# Patient Record
Sex: FEMALE | Race: WHITE | Employment: FULL TIME | URBAN - METROPOLITAN AREA
[De-identification: names, ages, dates, MRNs, and addresses within clinical notes are randomized per-mention and may not be internally consistent; named-entity substitution may affect disease eponyms.]

---

## 2017-01-10 ENCOUNTER — HOSPITAL ENCOUNTER (OUTPATIENT)
Dept: GENERAL RADIOLOGY | Age: 47
Discharge: HOME | End: 2017-01-10
Attending: SURGERY | Admitting: SURGERY

## 2017-01-10 LAB
ANION GAP SERPL CALCULATED.3IONS-SCNC: 12 MMOL/L (ref 4–16)
BUN BLDV-MCNC: 11 MG/DL (ref 6–23)
CALCIUM SERPL-MCNC: 9.4 MG/DL (ref 8.3–10.6)
CHLORIDE BLD-SCNC: 105 MMOL/L (ref 99–110)
CO2: 26 MMOL/L (ref 21–32)
CREAT SERPL-MCNC: 1 MG/DL (ref 0.6–1.1)
GFR AFRICAN AMERICAN: >60 ML/MIN/1.73M2
GFR NON-AFRICAN AMERICAN: 59 ML/MIN/1.73M2
GLUCOSE BLD-MCNC: 93 MG/DL (ref 70–140)
POTASSIUM SERPL-SCNC: 4.4 MMOL/L (ref 3.5–5.1)
SODIUM BLD-SCNC: 143 MMOL/L (ref 135–145)

## 2017-01-11 ENCOUNTER — HOSPITAL ENCOUNTER (OUTPATIENT)
Dept: OTHER | Age: 47
Discharge: HOME | End: 2017-01-11
Attending: SURGERY | Admitting: SURGERY

## 2017-01-11 ENCOUNTER — OFFICE VISIT (OUTPATIENT)
Dept: BARIATRICS/WEIGHT MGMT | Age: 47
End: 2017-01-11

## 2017-01-11 VITALS
HEART RATE: 65 BPM | SYSTOLIC BLOOD PRESSURE: 119 MMHG | WEIGHT: 160.6 LBS | BODY MASS INDEX: 25.21 KG/M2 | HEIGHT: 67 IN | DIASTOLIC BLOOD PRESSURE: 57 MMHG | OXYGEN SATURATION: 97 %

## 2017-01-11 DIAGNOSIS — E89.0 S/P TOTAL THYROIDECTOMY: ICD-10-CM

## 2017-01-11 DIAGNOSIS — R53.83 FATIGUE, UNSPECIFIED TYPE: ICD-10-CM

## 2017-01-11 DIAGNOSIS — E89.0 POSTOPERATIVE HYPOTHYROIDISM: Primary | ICD-10-CM

## 2017-01-11 LAB
CALCIUM IONIZED: 4.44 MG/DL (ref 4.48–5.28)
IONIZED CA: 1.11 MMOL/L (ref 1.12–1.32)

## 2017-01-11 PROCEDURE — 99213 OFFICE O/P EST LOW 20 MIN: CPT | Performed by: SURGERY

## 2017-01-11 ASSESSMENT — ENCOUNTER SYMPTOMS
SHORTNESS OF BREATH: 0
VOMITING: 0
COUGH: 0
NAUSEA: 0
ANAL BLEEDING: 0
CONSTIPATION: 0
DIARRHEA: 0
WHEEZING: 0
COLOR CHANGE: 0
VOICE CHANGE: 0
BLOOD IN STOOL: 0
SORE THROAT: 0
ABDOMINAL PAIN: 0
TROUBLE SWALLOWING: 0
PHOTOPHOBIA: 0

## 2020-09-23 ENCOUNTER — OFFICE VISIT (OUTPATIENT)
Dept: CARDIOLOGY CLINIC | Age: 50
End: 2020-09-23
Payer: COMMERCIAL

## 2020-09-23 VITALS
SYSTOLIC BLOOD PRESSURE: 128 MMHG | WEIGHT: 200 LBS | DIASTOLIC BLOOD PRESSURE: 82 MMHG | HEART RATE: 70 BPM | HEIGHT: 66 IN | BODY MASS INDEX: 32.14 KG/M2

## 2020-09-23 PROBLEM — R06.02 SHORTNESS OF BREATH ON EXERTION: Status: ACTIVE | Noted: 2020-09-23

## 2020-09-23 PROBLEM — R00.2 PALPITATIONS: Status: ACTIVE | Noted: 2020-09-23

## 2020-09-23 PROBLEM — R07.2 PRECORDIAL PAIN: Status: ACTIVE | Noted: 2020-09-23

## 2020-09-23 PROCEDURE — 99204 OFFICE O/P NEW MOD 45 MIN: CPT | Performed by: INTERNAL MEDICINE

## 2020-09-23 PROCEDURE — 93000 ELECTROCARDIOGRAM COMPLETE: CPT | Performed by: INTERNAL MEDICINE

## 2020-09-23 RX ORDER — OMEPRAZOLE 40 MG/1
40 CAPSULE, DELAYED RELEASE ORAL DAILY
COMMUNITY
Start: 2020-07-20

## 2020-10-05 ENCOUNTER — PROCEDURE VISIT (OUTPATIENT)
Dept: CARDIOLOGY CLINIC | Age: 50
End: 2020-10-05
Payer: COMMERCIAL

## 2020-10-05 VITALS
BODY MASS INDEX: 30.01 KG/M2 | HEART RATE: 88 BPM | SYSTOLIC BLOOD PRESSURE: 128 MMHG | DIASTOLIC BLOOD PRESSURE: 72 MMHG | WEIGHT: 198 LBS | HEIGHT: 68 IN

## 2020-10-05 LAB
LV EF: 58 %
LVEF MODALITY: NORMAL

## 2020-10-05 PROCEDURE — 93306 TTE W/DOPPLER COMPLETE: CPT | Performed by: INTERNAL MEDICINE

## 2020-10-05 PROCEDURE — 93015 CV STRESS TEST SUPVJ I&R: CPT | Performed by: INTERNAL MEDICINE

## 2020-10-08 ENCOUNTER — TELEPHONE (OUTPATIENT)
Dept: CARDIOLOGY CLINIC | Age: 50
End: 2020-10-08

## 2020-10-08 NOTE — TELEPHONE ENCOUNTER
Advised patient of results. Patient voiced understanding. Technically difficult examination due to poor acoustic views. .   Left ventricular function is normal, EF is estimated at 55-60%. E/A reversal; indeterminate diastolic function. No regional wall motion abnormalities were detected. No significant valvular abnormalities. No evidence of pericardial effusion. Overall Impression:   Normal exercise performance without angina and ischemic EKG changes. Has baeline EKG changes   Functional Capacity: Fair Exercise Tolerance.

## 2020-10-27 ENCOUNTER — OFFICE VISIT (OUTPATIENT)
Dept: CARDIOLOGY CLINIC | Age: 50
End: 2020-10-27
Payer: COMMERCIAL

## 2020-10-27 VITALS
WEIGHT: 202.6 LBS | DIASTOLIC BLOOD PRESSURE: 82 MMHG | HEIGHT: 67 IN | HEART RATE: 64 BPM | SYSTOLIC BLOOD PRESSURE: 114 MMHG | BODY MASS INDEX: 31.8 KG/M2

## 2020-10-27 PROCEDURE — 99214 OFFICE O/P EST MOD 30 MIN: CPT | Performed by: INTERNAL MEDICINE

## 2020-10-27 NOTE — PROGRESS NOTES
Sudha Carreno MD                                  CARDIOLOGY  NOTE      Chief Complaint:      Follow-up on chest pain and dyspnea    Currently denies any chest pain shortness of breath or palpitations    Underwent exercise stress test which was normal  Underwent echocardiogram which was also normal  Reports as below    Echocardiogram:     Summary   Technically difficult examination due to poor acoustic views. .   Left ventricular function is normal, EF is estimated at 55-60%. E/A reversal; indeterminate diastolic function. No regional wall motion abnormalities were detected. No significant valvular abnormalities. No evidence of pericardial effusion. Stress Test:     Conclusions      Summary   Overall Impression:   Normal exercise performance without angina and ischemic EKG changes. Has baeline EKG changes   Functional Capacity: Fair Exercise Tolerance. Current Outpatient Medications   Medication Sig Dispense Refill    omeprazole (PRILOSEC) 40 MG delayed release capsule Take 40 mg by mouth daily      levothyroxine (LEVOTHROID) 125 MCG tablet Take 1 tablet by mouth Daily 30 tablet 2    Calcium Citrate-Vitamin D (CALCIUM + VIT D, BARIATRIC ADVANTAGE, CHEWABLE TABLET) Take 1 tablet by mouth 2 times daily 120 tablet 5    rizatriptan (MAXALT) 10 MG tablet Take 10 mg by mouth as needed for Migraine       topiramate (TOPAMAX) 25 MG tablet Take 25 mg by mouth nightly        No current facility-administered medications for this visit. Allergies:     Sulfa antibiotics    Patient History:    Past Medical History:   Diagnosis Date    Anesthesia     \"have trouble with my blood pressure going really low after surgery\"    Anxiety     Cough     Occ.  Nonproductive Cough    Difficulty swallowing     \"Due To The Thyroid\"    History of echocardiogram 10/05/2020    EF 55-60%, E/A reversal, indeterminate diastolic function, no significant valvular abnormalities, no pericardial effusion     History heartburn  · Genitourinary: No dysuria, trouble voiding, or hematuria  · Musculoskeletal:  denies any new  joint aches , or pain   · Integumentary: No rash or pruritis  · Neurological: No TIA or stroke symptoms  · Psychiatric: No anxiety or depression  · Endocrine: No malaise, fatigue or temperature intolerance  · Hematologic/Lymphatic: No bleeding problems, blood clots or swollen lymph nodes  · Allergic/Immunologic: No nasal congestion or hives        Objective:      Physical Exam:    /82   Pulse 64   Ht 5' 7\" (1.702 m)   Wt 202 lb 9.6 oz (91.9 kg)   BMI 31.73 kg/m²   Wt Readings from Last 3 Encounters:   10/27/20 202 lb 9.6 oz (91.9 kg)   10/05/20 198 lb (89.8 kg)   09/23/20 200 lb (90.7 kg)     Body mass index is 31.73 kg/m². Vitals:    10/27/20 1424   BP: 114/82   Pulse: 64        General Appearance and Constitutional: Conversant, Well developed, Well nourished, No acute distress, Non-toxic appearance. HEENT:  Normocephalic, Atraumatic, Bilateral external ears normal, Oropharynx moist, No oral exudates,   Nose normal.   Neck- Normal range of motion, No tenderness, Supple  Eyes:  EOMI, Conjunctiva normal, No discharge. Respiratory:  Normal breath sounds, No respiratory distress, No wheezing, No Rales, No Ronchi. No chest tenderness. Cardiovascular: S1-S2, no added heart sounds, No Mumurs appreciated. No gallops, rubs. No Pedal Edema   GI:  Bowel sounds normal, Soft, No tenderness,  :  No costovertebral angle tenderness   Musculoskeletal:  No gross deformities.  Back- No tenderness  Integument:  Well hydrated, no rash   Lymphatic:  No lymphadenopathy noted   Neurologic:  Alert & oriented x 3, Normal motor function, normal sensory function, no focal deficits noted   Psychiatric:  Speech and behavior appropriate       Medical decision making and Data review:    DATA:    No results found for: TROPONINT  BNP:  No results found for: PROBNP  PT/INR:  No results found for: PTINR  No results found for: LABA1C  Lab Results   Component Value Date    CHOL 214 (H) 03/07/2016    TRIG 69 03/07/2016    HDL 68 03/07/2016    LDLCALC 132 (H) 03/07/2016     Lab Results   Component Value Date    WBC 6.0 05/09/2016    HGB 14.0 05/09/2016    HCT 43.1 05/09/2016    MCV 90.5 05/09/2016     05/09/2016     TSH: No results found for: TSH  Lab Results   Component Value Date    AST 11 (L) 03/07/2016    ALT 9 (L) 03/07/2016    BILIDIR 0.2 03/07/2016    BILITOT 0.5 03/07/2016    ALKPHOS 69 03/07/2016         All labs, medications and tests reviewed by myself including data and history from outside source , patient and available family . Impression and Plan:      1. Atypical chest pain. As described above in HPI.   exercise stress test for risk-stratification and to rule out CAD = Negative for ischemia. Symptoms likely GERD related. 2. Shortness of breath with exertion. chocardiogram to evaluate for any structural heart disease negative. Dyspnea improved since last visit. Likely deconditioning   3. Iatrogenic hypothyroidism: Patient is on levothyroxine, follow-up with primary care physician for care  4. History of migraine headaches: Patient is on Maxalt. 5. Diet and Exercise: Patient was encouraged to increase exercise, above symptoms could be related to deconditioning. Return in about 1 year (around 10/27/2021). Counseled extensively and medication compliance urged. We discussed that for the  prevention of ASCVD our  goal is aggressive risk modification. Patient is encouraged to exercise even a brisk walk for 30 minutes  at least 3 to 4 times a week   Various goals were discussed and questions answered. Continue current medications. Appropriate prescriptions are addressed and refills ordered. Questions answered and patient verbalizes understanding. Call for any problems, questions, or concerns.

## 2020-11-12 ENCOUNTER — HOSPITAL ENCOUNTER (OUTPATIENT)
Age: 50
Setting detail: SPECIMEN
Discharge: HOME | End: 2020-11-12
Payer: COMMERCIAL

## 2020-11-12 PROCEDURE — U0002 COVID-19 LAB TEST NON-CDC: HCPCS

## 2020-11-14 LAB
SARS-COV-2: DETECTED
SOURCE: ABNORMAL

## 2021-04-08 ENCOUNTER — OFFICE VISIT (OUTPATIENT)
Dept: ORTHOPEDIC SURGERY | Age: 51
End: 2021-04-08
Payer: COMMERCIAL

## 2021-04-08 VITALS
HEART RATE: 71 BPM | WEIGHT: 202 LBS | HEIGHT: 67 IN | OXYGEN SATURATION: 98 % | BODY MASS INDEX: 31.71 KG/M2 | RESPIRATION RATE: 15 BRPM

## 2021-04-08 DIAGNOSIS — M25.511 ACUTE PAIN OF RIGHT SHOULDER: Primary | ICD-10-CM

## 2021-04-08 DIAGNOSIS — S43.421A SPRAIN OF RIGHT ROTATOR CUFF CAPSULE, INITIAL ENCOUNTER: ICD-10-CM

## 2021-04-08 PROCEDURE — 99203 OFFICE O/P NEW LOW 30 MIN: CPT | Performed by: ORTHOPAEDIC SURGERY

## 2021-04-08 RX ORDER — ATORVASTATIN CALCIUM 10 MG/1
TABLET, FILM COATED ORAL
COMMUNITY
Start: 2021-03-10

## 2021-04-08 RX ORDER — TOPIRAMATE 50 MG/1
TABLET, FILM COATED ORAL
COMMUNITY
Start: 2021-03-05

## 2021-04-08 ASSESSMENT — ENCOUNTER SYMPTOMS
COLOR CHANGE: 0
SHORTNESS OF BREATH: 0
CHEST TIGHTNESS: 0
EYE PAIN: 0
WHEEZING: 0
VOMITING: 0
EYE REDNESS: 0

## 2021-04-08 NOTE — PROGRESS NOTES
4/8/2021   Chief Complaint   Patient presents with    Shoulder Pain     right shoulder pain         History of Present Illness: Eze Villegas is a 45 y.o. female who presents today for evaluation of her right shoulder pain and weakness. She did have an injury 4 months ago while lifting a heavy blanket and felt a sharp burning pain in the right shoulder immediately at the time of the injury. Since that time she had weakness issues and difficulty with elevating and reaching the arm out away from her body or up overhead. She has severe pain deep in the shoulder joint and it does radiate down the lateral aspect of her arm and posteriorly into her shoulder blade. She denies any previous history of injuries or problems with the right shoulder. She has tried treating this conservatively with rest and stretching exercises at home and anti-inflammatory medications but has not had any relief over the last 4 months. Patient presents to the office today for right shoulder pain. Pt states that in December she was lifting a weighted blanket above her head and felt an instant pain in the right shoulder. Pt states she does have a burning pain in the right shoulder. Pt states that pain is globally in the shoulder and will travel into her biceps. pt states that she has loss of ROM of the shoulder she can not lift her arm above her chest or reach out to grab things. Pt states that she is in constant pain in the right shoulder. Pt states that pain today is a 6/10        Medical History  Patient's medications, allergies, past medical, surgical, social and family histories were reviewed and updated as appropriate. Past Medical History:   Diagnosis Date    Anesthesia     \"have trouble with my blood pressure going really low after surgery\"    Anxiety     Cough     Occ.  Nonproductive Cough    Difficulty swallowing     \"Due To The Thyroid\"    History of echocardiogram 10/05/2020    EF 55-60%, E/A reversal, indeterminate diastolic function, no significant valvular abnormalities, no pericardial effusion     History of exercise stress test 10/05/2020    Treadmill, Normal exercise performance without angina and ischemic EKG changes.  Has baseline EKG changes    HX OTHER MEDICAL     \"have to watch, limited ROM of neck since neck surgery\"    Migraine Last Migraine 3-31-16    Shortness of breath     Sinus drainage     Teeth missing     Upper And Lower    Thyroid disease     thyroid nodules\"have had two previous biopsies in the past\"    Wears glasses     [place] teeth extracted     4 [place] Teeth Extracted In Past     Past Surgical History:   Procedure Laterality Date    BACK SURGERY  12-14    ACF \"C6, C7\" With Hardware    BREAST ENHANCEMENT SURGERY Bilateral 2000's    \"Breast Implants\"    DENTAL SURGERY      Teeth Extracted In Past    ENDOSCOPY, COLON, DIAGNOSTIC      egd- \"swallowed a safety pin\"- in 6th grade-was in ICU for 3 days\"    THYROID SURGERY  3-30-16 Fine Needle Aspiration Biopsy    THYROIDECTOMY, COMPLETION N/A 04/12/2016    TUBAL LIGATION  1993    WISDOM TOOTH EXTRACTION      4 [place] Teeth Extracted In Past     Family History   Problem Relation Age of Onset    Heart Disease Father         Open Heart Surgery    High Cholesterol Father     Hearing Loss Sister     Arthritis Mother         Rheumatoid Arthritis    Miscarriages / Stillbirths Mother     Substance Abuse Mother         Alcohol    Cancer Sister         Cancer Unsure What Kind    Substance Abuse Brother         Drugs     Social History     Socioeconomic History    Marital status:      Spouse name: None    Number of children: None    Years of education: None    Highest education level: None   Occupational History    None   Social Needs    Financial resource strain: None    Food insecurity     Worry: None     Inability: None    Transportation needs     Medical: None     Non-medical: None   Tobacco Use    Smoking status: Never Smoker    Smokeless tobacco: Never Used   Substance and Sexual Activity    Alcohol use: No    Drug use: No    Sexual activity: Yes     Partners: Male   Lifestyle    Physical activity     Days per week: None     Minutes per session: None    Stress: None   Relationships    Social connections     Talks on phone: None     Gets together: None     Attends Christianity service: None     Active member of club or organization: None     Attends meetings of clubs or organizations: None     Relationship status: None    Intimate partner violence     Fear of current or ex partner: None     Emotionally abused: None     Physically abused: None     Forced sexual activity: None   Other Topics Concern    None   Social History Narrative    None     Current Outpatient Medications   Medication Sig Dispense Refill    omeprazole (PRILOSEC) 40 MG delayed release capsule Take 40 mg by mouth daily      levothyroxine (LEVOTHROID) 125 MCG tablet Take 1 tablet by mouth Daily 30 tablet 2    Calcium Citrate-Vitamin D (CALCIUM + VIT D, BARIATRIC ADVANTAGE, CHEWABLE TABLET) Take 1 tablet by mouth 2 times daily 120 tablet 5    rizatriptan (MAXALT) 10 MG tablet Take 10 mg by mouth as needed for Migraine       topiramate (TOPAMAX) 25 MG tablet Take 25 mg by mouth nightly       topiramate (TOPAMAX) 50 MG tablet       atorvastatin (LIPITOR) 10 MG tablet        No current facility-administered medications for this visit. Allergies   Allergen Reactions    Sulfa Antibiotics Hives       Review of Systems:   Review of Systems   Constitutional: Negative for chills and fever. HENT: Negative for congestion and sneezing. Eyes: Negative for pain and redness. Respiratory: Negative for chest tightness, shortness of breath and wheezing. Cardiovascular: Negative for chest pain and palpitations. Gastrointestinal: Negative for vomiting. Musculoskeletal: Positive for arthralgias.    Skin: Negative for color empty can test.  Positive drop arm sign. Positive Garcia and Neer signs. Moderate pain at the acromioclavicular joint with crossarm test.    Skin is intact. Sensation is intact in the upper extremity. 2+ radial pulse. No edema. No muscle atrophy. Skin:     General: Skin is warm and dry. Neurological:      Mental Status: She is alert and oriented to person, place, and time. Psychiatric:         Mood and Affect: Mood normal.         Behavior: Behavior normal.            Diagnostic testing:  X-ray images were reviewed by myself and discussed with the patient:  Xr Shoulder Right (min 2 Views)    Result Date: 4/8/2021  X-ray 4 views Right Shoulder: X-rays show no evidence of fracture or degenerative process. Normal bone density. Normal alignment. No abnormal calcifications or soft tissue swelling. Impression: Normal x-ray       Office Procedures:  No orders of the defined types were placed in this encounter. Assessment and Plan  1. Right shoulder sprain, possible rotator cuff tear    The patient is having severe pain and dysfunction in the shoulder following an injury 4 months ago despite appropriate conservative measures. I feel it is medically necessary to obtain an MRI to evaluate for tear of the rotator cuff and other intra-articular pathology such injury to the labrum, biceps tendon, and cartilage surfaces of the shoulder. I have instructed the patient on gentle range of motion exercises for the shoulder and avoidance of lifting pushing and pulling with that arm. We discussed anti-inflammatory medications and she will continue ibuprofen over-the-counter as needed. They will follow-up after the MRI for a review of the results.       Electronically signed by Vianey Rodriguez MD on 4/8/2021 at 9:00 AM

## 2021-04-08 NOTE — PATIENT INSTRUCTIONS
Continue weight-bearing as tolerated. Continue range of motion exercises as instructed. Ice and elevate as needed. Tylenol or Motrin for pain.   MRI of the right shoulder ordered today   Follow up in 2/3 weeks for FU of the MRI   Central Scheduling [phone]

## 2021-04-20 ENCOUNTER — HOSPITAL ENCOUNTER (OUTPATIENT)
Dept: MRI IMAGING | Age: 51
Discharge: HOME | End: 2021-04-20
Payer: COMMERCIAL

## 2021-04-20 DIAGNOSIS — M25.511 ACUTE PAIN OF RIGHT SHOULDER: ICD-10-CM

## 2021-04-20 PROCEDURE — 73221 MRI JOINT UPR EXTREM W/O DYE: CPT

## 2021-04-29 ENCOUNTER — OFFICE VISIT (OUTPATIENT)
Dept: ORTHOPEDIC SURGERY | Age: 51
End: 2021-04-29
Payer: COMMERCIAL

## 2021-04-29 VITALS
HEIGHT: 67 IN | WEIGHT: 202 LBS | HEART RATE: 80 BPM | OXYGEN SATURATION: 99 % | RESPIRATION RATE: 15 BRPM | BODY MASS INDEX: 31.71 KG/M2

## 2021-04-29 DIAGNOSIS — M25.511 ACUTE PAIN OF RIGHT SHOULDER: ICD-10-CM

## 2021-04-29 DIAGNOSIS — S43.421A SPRAIN OF RIGHT ROTATOR CUFF CAPSULE, INITIAL ENCOUNTER: Primary | ICD-10-CM

## 2021-04-29 DIAGNOSIS — M75.01 ADHESIVE CAPSULITIS OF RIGHT SHOULDER: ICD-10-CM

## 2021-04-29 PROCEDURE — 20610 DRAIN/INJ JOINT/BURSA W/O US: CPT | Performed by: ORTHOPAEDIC SURGERY

## 2021-04-29 PROCEDURE — 99214 OFFICE O/P EST MOD 30 MIN: CPT | Performed by: ORTHOPAEDIC SURGERY

## 2021-04-29 ASSESSMENT — ENCOUNTER SYMPTOMS
SHORTNESS OF BREATH: 0
CHEST TIGHTNESS: 0
COLOR CHANGE: 0

## 2021-04-29 NOTE — PATIENT INSTRUCTIONS
Continue weight-bearing as tolerated. Continue range of motion exercises as instructed. Ice and elevate as needed. Tylenol or Motrin for pain. steroid injection given today in the right shoulder today   Follow up in 6 weeks  physical therapy ordered today     Patient Education        Frozen Shoulder: Exercises  Introduction  Here are some examples of exercises for you to try. The exercises may be suggested for a condition or for rehabilitation. Start each exercise slowly. Ease off the exercises if you start to have pain. You will be told when to start these exercises and which ones will work best for you. How to do the exercises  Neck stretches   1. Look straight ahead, and tip your right ear to your right shoulder. Do not let your left shoulder rise up as you tip your head to the right. 2. Hold 15 to 30 seconds. 3. Tilt your head to the left. Do not let your right shoulder rise up as you tip your head to the left. 4. Hold for 15 to 30 seconds. 5. Repeat 2 to 4 times to each side. Shoulder rolls   1. Sit comfortably with your feet shoulder-width apart. You can also do this exercise while standing. 2. Roll your shoulders up, then back, and then down in a smooth, circular motion. 3. Repeat 2 to 4 times. Shoulder flexion (lying down)   For this exercise, you will need a wand. To make a wand, use a piece of PVC pipe or a broom handle with the broom removed. Make the wand about a foot wider than your shoulders. 1. Lie on your back, holding a wand with your hands. Your palms should face down as you hold the wand. Place your hands slightly wider than your shoulders. 2. Keeping your elbows straight, slowly raise your arms over your head until you feel a stretch in your shoulders, upper back, and chest.  3. Hold 15 to 30 seconds. 4. Repeat 2 to 4 times. Shoulder rotation (lying down)   To make a wand, use a piece of PVC pipe or a broom handle with the broom removed.  Make the wand about a foot wider than your shoulders. 1. Lie on your back and hold a wand in both hands with your elbows bent and your palms up. 2. Keeping your elbows close to your body, move the wand across your body toward the arm that has pain. 3. Hold for 15 to 30 seconds. 4. Repeat 2 to 4 times. Shoulder internal rotation with towel   1. Roll up a towel lengthwise. Hold the towel above and behind your head with the arm that is not sore. 2. With your sore arm, reach behind your back and grasp the towel. 3. Using the arm above your head, pull the towel upward until you feel a stretch on the front and outside of your sore shoulder. 4. Hold 15 to 30 seconds. 5. Relax and move the towel back down to the starting position. 6. Repeat 2 to 4 times. Shoulder blade squeeze   1. While standing with your arms at your sides, squeeze your shoulder blades together. Do not raise your shoulders up as you are squeezing. 2. Hold for 6 seconds. 3. Repeat 8 to 12 times. Follow-up care is a key part of your treatment and safety. Be sure to make and go to all appointments, and call your doctor if you are having problems. It's also a good idea to know your test results and keep a list of the medicines you take. Where can you learn more? Go to https://[employer]. org and sign in to your [employer] account. Enter [phone] in the [facility] box to learn more about \"Frozen Shoulder: Exercises. \"     If you do not have an account, please click on the \"Sign Up Now\" link. Current as of: November 16, 2020               Content Version: 12.8  © 4330-1752 [employer]. Care instructions adapted under license by [facility] [place] ([facility]). If you have questions about a medical condition or this instruction, always ask your healthcare professional. [address] any warranty or liability for your use of this information.

## 2021-04-29 NOTE — PROGRESS NOTES
4/29/2021   Chief Complaint   Patient presents with    Shoulder Pain     right shoulder MRI completed on         History of Present Illness: Dilip Rai is a 45 y.o. female who comes today for follow-up of her right shoulder. She recently had her MRI. She continues to have severe pain and stiffness throughout the shoulder with difficulty with any type of movement or use. He has pain while laying on that side. Pain is constant throughout the day and very limiting. She has been trying to work on stretching exercises but feels that she has been very limited with her home program.      Pt returns to the office today for FU of the right shoulder MRI completed on 4/20/21. Pt sates pain today in the right shoulder is an 9/10 pt states since she has been using her shoulder more and since the Mri her pain has increased. Pt states she is having a difficult time sleeping do to the pain. Pt denies and changes in her hx.             Medical History  Patient's medications, allergies, past medical, surgical, social and family histories were reviewed and updated as appropriate. Past Medical History:   Diagnosis Date    Anesthesia     \"have trouble with my blood pressure going really low after surgery\"    Anxiety     Cough     Occ. Nonproductive Cough    Difficulty swallowing     \"Due To The Thyroid\"    History of echocardiogram 10/05/2020    EF 55-60%, E/A reversal, indeterminate diastolic function, no significant valvular abnormalities, no pericardial effusion     History of exercise stress test 10/05/2020    Treadmill, Normal exercise performance without angina and ischemic EKG changes.  Has baseline EKG changes    HX OTHER MEDICAL     \"have to watch, limited ROM of neck since neck surgery\"    Migraine Last Migraine 3-31-16    Shortness of breath     Sinus drainage     Teeth missing     Upper And Lower    Thyroid disease     thyroid nodules\"have had two previous biopsies in the past\"    Wears glasses     Luverne teeth extracted     4 [place] Teeth Extracted In Past     Past Surgical History:   Procedure Laterality Date    BACK SURGERY  12-14    ACF \"C6, C7\" With Hardware    BREAST ENHANCEMENT SURGERY Bilateral 2000's    \"Breast Implants\"    DENTAL SURGERY      Teeth Extracted In Past    ENDOSCOPY, COLON, DIAGNOSTIC      egd- \"swallowed a safety pin\"- in 6th grade-was in ICU for 3 days\"    THYROID SURGERY  3-30-16 Fine Needle Aspiration Biopsy    THYROIDECTOMY, COMPLETION N/A 04/12/2016    TUBAL LIGATION  1993    WISDOM TOOTH EXTRACTION      4 [place] Teeth Extracted In Past     Family History   Problem Relation Age of Onset    Heart Disease Father         Open Heart Surgery    High Cholesterol Father     Hearing Loss Sister     Arthritis Mother         Rheumatoid Arthritis    Miscarriages / Stillbirths Mother     Substance Abuse Mother         Alcohol    Cancer Sister         Cancer Unsure What Kind    Substance Abuse Brother         Drugs     Social History     Socioeconomic History    Marital status:      Spouse name: None    Number of children: None    Years of education: None    Highest education level: None   Occupational History    None   Social Needs    Financial resource strain: None    Food insecurity     Worry: None     Inability: None    Transportation needs     Medical: None     Non-medical: None   Tobacco Use    Smoking status: Never Smoker    Smokeless tobacco: Never Used   Substance and Sexual Activity    Alcohol use: No    Drug use: No    Sexual activity: Yes     Partners: Male   Lifestyle    Physical activity     Days per week: None     Minutes per session: None    Stress: None   Relationships    Social connections     Talks on phone: None     Gets together: None     Attends [employer] service: None     Active member of club or organization: None     Attends meetings of clubs or organizations: None     Relationship status: None  Intimate partner violence     Fear of current or ex partner: None     Emotionally abused: None     Physically abused: None     Forced sexual activity: None   Other Topics Concern    None   Social History Narrative    None     Current Outpatient Medications   Medication Sig Dispense Refill    topiramate (TOPAMAX) 50 MG tablet       atorvastatin (LIPITOR) 10 MG tablet       omeprazole (PRILOSEC) 40 MG delayed release capsule Take 40 mg by mouth daily      levothyroxine (LEVOTHROID) 125 MCG tablet Take 1 tablet by mouth Daily 30 tablet 2    Calcium Citrate-Vitamin D (CALCIUM + VIT D, BARIATRIC ADVANTAGE, CHEWABLE TABLET) Take 1 tablet by mouth 2 times daily 120 tablet 5    rizatriptan (MAXALT) 10 MG tablet Take 10 mg by mouth as needed for Migraine       topiramate (TOPAMAX) 25 MG tablet Take 25 mg by mouth nightly        No current facility-administered medications for this visit. Allergies   Allergen Reactions    Sulfa Antibiotics Hives       Review of Systems:   Review of Systems   Constitutional: Negative for activity change and fever. Respiratory: Negative for chest tightness and shortness of breath. Cardiovascular: Negative for chest pain. Musculoskeletal: Positive for arthralgias. Skin: Negative for color change. Neurological: Negative for dizziness and weakness. Psychiatric/Behavioral: The patient is not nervous/anxious. Examination:  General Exam:  Vitals: Pulse 80   Resp 15   Ht 5' 7\" (1.702 m)   Wt 202 lb (91.6 kg)   SpO2 99%   BMI 31.64 kg/m²    Physical Exam  Vitals signs and nursing note reviewed. Constitutional:       Appearance: Normal appearance. HENT:      Head: Normocephalic and atraumatic. Eyes:      Conjunctiva/sclera: Conjunctivae normal.      Pupils: Pupils are equal, round, and reactive to light. Neck:      Musculoskeletal: Normal range of motion.    Pulmonary:      Effort: Pulmonary effort is normal. Musculoskeletal:      Left shoulder: She exhibits normal range of motion, no tenderness, no bony tenderness, no swelling, no deformity, no laceration, no pain, normal pulse and normal strength. Right elbow: She exhibits normal range of motion, no swelling, no effusion, no deformity and no laceration. No tenderness found. Left elbow: Normal.      Comments: Right Upper Extremity:    There is moderate to severe tenderness diffusely throughout the shoulder. Tenderness to palpation is maximal over the anterior and lateral aspects of the shoulder specifically along the greater tuberosity and proximal biceps tendon. Active range of motion is severely limited due to pain. Forward elevation present to 40 degrees, abduction present to 30 degrees, external rotation 15 degrees, internal rotation to the greater trochanter. Passive range of motion is severely restricted and limited due to pain and apprehension. Forward elevation 60 degrees, abduction 60 degrees. Rotator cuff testing is difficult due to pain. Strength 0/5 forward elevation and abduction of the shoulder severely limited due to pain and apprehension and inability to perform. There is breakaway to strength testing due to pain. Positive empty can test.  Positive drop arm sign. Positive Garcia and Neer signs. Moderate pain at the acromioclavicular joint with crossarm test.    Skin is intact. Sensation is intact in the upper extremity. 2+ radial pulse. No edema. No muscle atrophy. Skin:     General: Skin is warm and dry. Neurological:      Mental Status: She is alert and oriented to person, place, and time.    Psychiatric:         Mood and Affect: Mood normal.         Behavior: Behavior normal.            Diagnostic testing:  X-ray images were reviewed by myself and discussed with the patient:  MRI of the right shoulder completed on 4/20/21      No discrete fluid-filled rotator cuff tear is seen.       Slight attenuation of the intra-articular portion of the long head of biceps   tendon which may reflect partial-thickness attritional-type tearing.       Blunting and increased signal intensity seen within the anterior and   anteroinferior labrum which may reflect a combination of degeneration and   tearing.       Mild osteoarthritis affecting the acromioclavicular joint.       No evidence of acute fracture, dislocation or avascular necrosis seen.           Office Procedures:  Orders Placed This Encounter   Procedures   [facility]     Referral Priority:   Routine     Referral Type:   Eval and Treat     Referral Reason:   Specialty Services Required     Requested Specialty:   Physical Therapy     Number of Visits Requested:   1    [place] ARTHROCENTESIS ASPIR&/INJ MAJOR JT/BURSA W/O US       Assessment and Plan  1. Right shoulder adhesive capsulitis    The patient's exam and MRI are consistent with adhesive capsulitis. We discussed the diagnosis of frozen shoulder and the expected recovery process. I have explained to her the importance of regular stretching exercises. I believe that she would benefit from formal physical therapy to help assist with regaining her mobility at the shoulder. I have sent a referral for physical therapy to address rehabilitation and strengthening of the rotator cuff. Due to the patient's current pain level I feel that they would benefit from a steroid injection to the right shoulder prior to participation in therapy. Procedure Note, Right Shoulder glenohumeral joint injection:  The right shoulder was prepped with alcohol and then the glenohumeral joint space was injected with 80 mg of Kenalog and 4 mL of 1% plain lidocaine using a 22-gauge needle. Sterile Band-Aid was applied. The patient tolerated it well with no complications.     I have instructed the patient on basic stretching exercises and we discussed activity modification and avoidance of strenuous activities at the shoulder. We discussed the use of over-the-counter anti-inflammatory medications as needed. I would like the patient to follow-up here in 6 weeks to check the results of the therapy and injection.         Electronically signed by Rancho Estrella MD on 4/29/2021 at 11:44 AM

## 2021-05-19 ENCOUNTER — HOSPITAL ENCOUNTER (OUTPATIENT)
Dept: PHYSICAL THERAPY | Age: 51
Setting detail: THERAPIES SERIES
Discharge: HOME | End: 2021-05-19
Payer: COMMERCIAL

## 2021-05-19 PROCEDURE — 97161 PT EVAL LOW COMPLEX 20 MIN: CPT

## 2021-05-19 PROCEDURE — 97110 THERAPEUTIC EXERCISES: CPT

## 2021-05-19 PROCEDURE — 97535 SELF CARE MNGMENT TRAINING: CPT

## 2021-05-19 NOTE — PROGRESS NOTES
compliance with HEP. Short term goal 2: Patient will present with improved R shoulder PROM by 10 degrees in all ranges. Short term goal 3: Patient will present with improved R shoulder flexion from 80 degrees AROM to 110 degrees AROM so that she can reach for items in her cabinet. Short term goal 4: Patient will report a decrease in R shoulder pain from 8/10 to 4/10. Short term goal 5: Patient will present with an improved score on the QuickDASH from 43.2% impaired to 33% impaired.   Patient Goals   Patient goals : Decrease R shoulder pain, improve ability to reach       Therapy Time   Individual Concurrent Group Co-treatment   Time In 1610         Time Out 1652         Minutes 42         Timed Code Treatment Minutes: [address], PT

## 2021-05-19 NOTE — FLOWSHEET NOTE
Outpatient Physical Therapy  [facility]           [x] Phone: [phone]   Fax: [phone]  [place]           [] Phone: [phone]   Fax: [phone]        Physical Therapy Daily Treatment Note  Date:  2021    Patient Name:  Bridget Terry    :  1970  MRN: [id number]  Restrictions/Precautions:    Diagnosis:   Diagnosis: Adhesive Capsulitis of right shoulder  Date of Injury/Surgery:   Treatment Diagnosis: Treatment Diagnosis: Adhesive Capsulitis of the R shoulder, R shoulder pain    Insurance/Certification information: PT Insurance Information: [employer]- Precert required after 12 visits. 20 visits PCY total   Referring Physician:  Referring Practitioner: Dr. Keivn Wong  Next Doctor Visit:    Plan of care signed (Y/N):  pending  Outcome Measure:   Visit# / total visits:     Pain level: 8/10 R shoulder  Goals:     Patient goals : Decrease R shoulder pain, improve ability to reach  Short term goals  Time Frame for Short term goals: 5 weeks  Short term goal 1: Patient will report compliance with HEP. Short term goal 2: Patient will present with improved R shoulder PROM by 10 degrees in all ranges. Short term goal 3: Patient will present with improved R shoulder flexion from 80 degrees AROM to 110 degrees AROM so that she can reach for items in her cabinet. Short term goal 4: Patient will report a decrease in R shoulder pain from 8/10 to 4/10. Short term goal 5: Patient will present with an improved score on the QuickDASH from 43.2% impaired to 33% impaired. Summary of Evaluation: Assessment: Nam Villalpando is a 45year old female who presents to physical therapy with adhesive capsulitis of the right shoulder. Objective findings include decreased R shoulder AROM and PROM, decreased R shoulder strength, and postural impairments.  Radha Ward will benefit from skilled therapeutic intervention in this setting to decrease pain and improve overall function of the right shoulder. Subjective:  See eval         Any changes in Ambulatory Summary Sheet? None        Objective:  See eval   COVID screening questions were asked and patient attested that there had been no contact or symptoms        Exercises: (No more than 4 columns)   Exercise/Equipment 5/19/21 #1 Date Date           WARM UP                     TABLE      Supine shoulder flexion AAROM w/cane X10, 10\" hold     Seated shoulder abduction AAROM w/cane X10, 10\" hold     Shoulder internal rotation behind back AAROM w/cane X5, 10\" hold     Shoulder flexion table walk back X5, 10\" hold     Pulleys next                    STANDING                                                     PROPRIOCEPTION                                    MODALITIES                      Other Therapeutic Activities/Education:  Education on anatomy of the shoulder joint and etiology of adhesive capsulitis. Discussed findings from initial evaluation. Discussed pain management techniques using heat to loosen the shoulder prior to stretches. Discussed buying pulley's for home use. Home Exercise Program:    Created and reviewed, exercises listed above    Manual Treatments:        Modalities:        Communication with other providers:  POC sent for cosign on 5/19/21      Assessment:  (Response towards treatment session) (Pain Rating)    Assessment: Hermelinda Gutierrez is a 45year old female who presents to physical therapy with adhesive capsulitis of the right shoulder. Objective findings include decreased R shoulder AROM and PROM, decreased R shoulder strength, and postural impairments. Wilder Bone will benefit from skilled therapeutic intervention in this setting to decrease pain and improve overall function of the right shoulder.       Plan for Next Session:        Time In / Time Out:    0129-1371       If [facility] Please Indicate Time In/Out/Total Time  CPT Code Time in Time out Total Time                                                            Total for session             Timed Code/Total Treatment Minutes:  42'/42'  Eval 24', There ex 10', ADL 8'      Next Progress Note due:  10th visit      Plan of Care Interventions:  [x] Therapeutic Exercise  [x] Modalities:  [x] Therapeutic Activity     [] Ultrasound  [] Estim  [] Gait Training      [] Cervical Traction [] Lumbar Traction  [x] Neuromuscular Re-education    [x] Cold/hotpack [] Iontophoresis   [x] Instruction in HEP      [x] Vasopneumatic   [] Dry Needling    [x] Manual Therapy               [] Aquatic Therapy              Electronically signed by:  Shaji Dawson, PT, 5/19/2021, 5:15 PM

## 2021-05-19 NOTE — PLAN OF CARE
Outpatient Physical Therapy           [facility]           [x] Phone: [phone]   Fax: [phone]  [place]           [] Phone: [phone]   Fax: [phone]     To: Referring Practitioner: Dr. Aleta Dominguez    From: Neri Culp PT, PT     Patient: Tati Mcgee       : 1970  Diagnosis: Diagnosis: Adhesive Capsulitis of right shoulder   Treatment Diagnosis: Treatment Diagnosis: Adhesive Capsulitis of the R shoulder, R shoulder pain   Date: 2021    Physical Therapy Certification/Re-Certification Form  Dear Dr. Aleta Dominguez  The following patient has been evaluated for physical therapy services and for therapy to continue, insurance requires physician review of the treatment plan initially and every 90 days. Please review the attached evaluation and/or summary of the patient's plan of care, and verify that you agree therapy should continue by signing the attached document and sending it back to our office. Assessment:    Assessment: Jayla Craven is a 45year old female who presents to physical therapy with adhesive capsulitis of the right shoulder. Objective findings include decreased R shoulder AROM and PROM, decreased R shoulder strength, and postural impairments. Coco Donis will benefit from skilled therapeutic intervention in this setting to decrease pain and improve overall function of the right shoulder.     Plan of Care/Treatment to date:  [x] Therapeutic Exercise  [x] Modalities:  [x] Therapeutic Activity     [] Ultrasound  [] Electrical Stimulation  [] Gait Training      [] Cervical Traction [] Lumbar Traction  [x] Neuromuscular Re-education    [x] Cold/hotpack [] Iontophoresis   [x] Instruction in HEP      [x] Vasopneumatic    [] Dry Needling  [x] Manual Therapy               [] Aquatic Therapy       Other:          Frequency/Duration:  # Days per week: [] 1 day # Weeks: [] 1 week [] 5 weeks     [x] 2 days   [] 2 weeks [x] 6 weeks     [] 3 days   [] 3 weeks [] 7 weeks     [] 4 days   [] 4

## 2021-05-28 ENCOUNTER — HOSPITAL ENCOUNTER (OUTPATIENT)
Dept: PHYSICAL THERAPY | Age: 51
Setting detail: THERAPIES SERIES
Discharge: HOME | End: 2021-05-28
Payer: COMMERCIAL

## 2021-05-28 PROCEDURE — 97110 THERAPEUTIC EXERCISES: CPT

## 2021-05-28 PROCEDURE — 97140 MANUAL THERAPY 1/> REGIONS: CPT

## 2021-05-28 PROCEDURE — 97016 VASOPNEUMATIC DEVICE THERAPY: CPT

## 2021-05-28 NOTE — FLOWSHEET NOTE
shoulder. Subjective:  Pt reports she now understands what the cortizone shot is all about - she can now sleep with almost no pain complaints!!       Any changes in Ambulatory Summary Sheet? None      Objective:  See below    COVID screening questions were asked and patient attested that there had been no contact or symptoms    Exercises: (No more than 4 columns)   Exercise/Equipment 5/19/21 #1 5/28/21  #2 Date           WARM UP                     TABLE      Supine shoulder flexion AAROM w/cane X10, 10\" hold     Seated shoulder abduction AAROM w/cane X10, 10\" hold     Shoulder internal rotation behind back AAROM w/cane X5, 10\" hold     Shoulder flexion table walk back X5, 10\" hold     Pulleys next 1x10 flexion          Manual therapy  See below    STANDING                                                     PROPRIOCEPTION                                    MODALITIES      Vaso  15'              Other Therapeutic Activities/Education:  Education on anatomy of the shoulder joint and etiology of adhesive capsulitis. Discussed findings from initial evaluation. Discussed pain management techniques using heat to loosen the shoulder prior to stretches. Discussed buying pulley's for home use. Home Exercise Program:  Created and reviewed, exercises listed above      Manual Treatments:  PROM/MOBS to the right shoulder joint complex with TPR/MFR to related hypertonicity      Modalities:  Game Ready to the right shoulder joint at low pressure for 15' in the seated position      Communication with other providers:  POC sent for cosign on 5/19/21      Assessment:  (Response towards treatment session) (Pain Rating)  Pt very tight all vectors and has difficulty relaxing - cues to relax help, but needed consistently throughout session today. Pt with firm end feels all vectors and with pain all vectors as well.   Pt may have been pushing herself harder than ideal on pulleys, as she indicates increased complaints of pain after pulleys. Pt will continue to benefit from skilled PT services to restore ROM to more functional ranges and improve scapular stability to return to prior activity levels safely. Pt rates pain at 6/10 before modalities. Assessment: Connie Yip is a 45year old female who presents to physical therapy with adhesive capsulitis of the right shoulder. Objective findings include decreased R shoulder AROM and PROM, decreased R shoulder strength, and postural impairments. Jason Orr will benefit from skilled therapeutic intervention in this setting to decrease pain and improve overall function of the right shoulder. Plan for Next Session:   Continue to progress towards goals set at IE.       Time In / Time Out:    1428/1524       If [facility] Please Indicate Time In/Out/Total Time  CPT Code Time in Time out Total Time                                                            Total for session             Timed Code/Total Treatment Minutes:  MAN X 31' X 2;   TE x 10' X 1;   GR x 15' X 1      Next Progress Note due:  10th visit      Plan of Care Interventions:  [x] Therapeutic Exercise  [x] Modalities:  [x] Therapeutic Activity     [] Ultrasound  [] Estim  [] Gait Training      [] Cervical Traction [] Lumbar Traction  [x] Neuromuscular Re-education    [x] Cold/hotpack [] Iontophoresis   [x] Instruction in HEP      [x] Vasopneumatic   [] Dry Needling    [x] Manual Therapy               [] Aquatic Therapy              Electronically signed by:  Garrett William II, PTA [id number]    5/28/2021, 7:29 AM

## 2021-06-02 ENCOUNTER — HOSPITAL ENCOUNTER (OUTPATIENT)
Dept: PHYSICAL THERAPY | Age: 51
Discharge: HOME | End: 2021-06-02

## 2021-06-02 NOTE — FLOWSHEET NOTE
Physical Therapy  Cancellation/No-show Note  Patient Name:  Srini Valencia  :  1970   Date:  2021  Cancelled visits to date:1  No-shows to date: 0    For today's appointment patient:  [x]  Cancelled  []  Rescheduled appointment  []  No-show     Reason given by patient:  []  Patient ill  []  Conflicting appointment  []  No transportation    []  Conflict with work  [x]  No reason given  []  Other:     Comments:      Electronically signed by:  Kilo Montemayor PTA     2021,12:50 PM

## 2021-06-04 ENCOUNTER — HOSPITAL ENCOUNTER (OUTPATIENT)
Dept: PHYSICAL THERAPY | Age: 51
Setting detail: THERAPIES SERIES
Discharge: HOME | End: 2021-06-04
Payer: COMMERCIAL

## 2021-06-04 PROCEDURE — 97016 VASOPNEUMATIC DEVICE THERAPY: CPT

## 2021-06-04 PROCEDURE — 97140 MANUAL THERAPY 1/> REGIONS: CPT

## 2021-06-04 PROCEDURE — 97110 THERAPEUTIC EXERCISES: CPT

## 2021-06-04 NOTE — FLOWSHEET NOTE
Outpatient Physical Therapy  [place]           [x] Phone: [phone]   Fax: [phone]  [place]           [] Phone: [phone]   Fax: [phone]        Physical Therapy Daily Treatment Note  Date:  2021    Patient Name:  Barry Ingram    :  1970  MRN: [id number]  Restrictions/Precautions:    Diagnosis:   Diagnosis: Adhesive Capsulitis of right shoulder  Date of Injury/Surgery:   Treatment Diagnosis: Treatment Diagnosis: Adhesive Capsulitis of the R shoulder, R shoulder pain    Insurance/Certification information: PT Insurance Information: [employer]- Precert required after 12 visits. 20 visits PCY total   Referring Physician:  Referring Practitioner: Dr. Christina Franklin  Next Doctor Visit:    Plan of care signed (Y/N):  pending  Outcome Measure:   Visit# / total visits:   3/12  Pain level: 0/10 R shoulder  Goals:     Patient goals : Decrease R shoulder pain, improve ability to reach  Short term goals  Time Frame for Short term goals: 5 weeks  Short term goal 1: Patient will report compliance with HEP. Short term goal 2: Patient will present with improved R shoulder PROM by 10 degrees in all ranges. Short term goal 3: Patient will present with improved R shoulder flexion from 80 degrees AROM to 110 degrees AROM so that she can reach for items in her cabinet. Short term goal 4: Patient will report a decrease in R shoulder pain from 8/10 to 4/10. Short term goal 5: Patient will present with an improved score on the QuickDASH from 43.2% impaired to 33% impaired. Summary of Evaluation: Assessment: Derek Mireles is a 45year old female who presents to physical therapy with adhesive capsulitis of the right shoulder. Objective findings include decreased R shoulder AROM and PROM, decreased R shoulder strength, and postural impairments.  Estefany Conn will benefit from skilled therapeutic intervention in this setting to decrease pain and improve overall function of the right shoulder. Subjective:  Pt reports she really only has complaints of ache - no real pain unless she really pushes it hard. Pt noticed her hand was swollen after her last session. Any changes in Ambulatory Summary Sheet? None      Objective:  See below    COVID screening questions were asked and patient attested that there had been no contact or symptoms    Exercises: (No more than 4 columns)   Exercise/Equipment 5/19/21 #1 5/28/21  #2 6/4/21  #3           WARM UP                     TABLE      Supine shoulder flexion AAROM w/cane X10, 10\" hold     Seated shoulder abduction AAROM w/cane X10, 10\" hold     Shoulder internal rotation behind back AAROM w/cane X5, 10\" hold     Shoulder flexion table walk back X5, 10\" hold     Pulleys next 1x10 flexion 1x15         Manual therapy  See below See below   STANDING                                                     PROPRIOCEPTION                                    MODALITIES      Vaso  15' 15'             Other Therapeutic Activities/Education:  Education on anatomy of the shoulder joint and etiology of adhesive capsulitis. Discussed findings from initial evaluation. Discussed pain management techniques using heat to loosen the shoulder prior to stretches. Discussed buying pulley's for home use. Home Exercise Program:  Created and reviewed, exercises listed above      Manual Treatments:  PROM/MOBS to the right shoulder joint complex with TPR/MFR to related hypertonicity      Modalities:  Game Ready to the right shoulder joint at low pressure for 15' in the seated position      Communication with other providers:  POC sent for cosign on 5/19/21      Assessment:  (Response towards treatment session) (Pain Rating)  Pt remains very tight all vectors. Pt with firm end feels all vectors and with pain all vectors as well.   Pt does demo some increases in AROM after manual interventions - but still very tight in general.  Pt will continue to benefit from skilled PT services to restore ROM to more functional ranges and improve scapular stability to return to prior activity levels safely. Pt rates pain at 0/10 after session today. Assessment: Magui Li is a 45year old female who presents to physical therapy with adhesive capsulitis of the right shoulder. Objective findings include decreased R shoulder AROM and PROM, decreased R shoulder strength, and postural impairments. Jessika Carmichael will benefit from skilled therapeutic intervention in this setting to decrease pain and improve overall function of the right shoulder. Plan for Next Session:   Continue to progress towards goals set at IE.       Time In / Time Out:    1445/1545       If [facility] Please Indicate Time In/Out/Total Time  CPT Code Time in Time out Total Time                                                            Total for session             Timed Code/Total Treatment Minutes:  MAN X 30' X 2;   TE x 15' X 1;   GR x 15' X 1      Next Progress Note due:  10th visit      Plan of Care Interventions:  [x] Therapeutic Exercise  [x] Modalities:  [x] Therapeutic Activity     [] Ultrasound  [] Estim  [] Gait Training      [] Cervical Traction [] Lumbar Traction  [x] Neuromuscular Re-education    [x] Cold/hotpack [] Iontophoresis   [x] Instruction in HEP      [x] Vasopneumatic   [] Dry Needling    [x] Manual Therapy               [] Aquatic Therapy              Electronically signed by:  Kristy Diaz II, PTA [id number]    6/4/2021, 7:28 AM

## 2021-06-07 ENCOUNTER — HOSPITAL ENCOUNTER (OUTPATIENT)
Dept: PHYSICAL THERAPY | Age: 51
Setting detail: THERAPIES SERIES
Discharge: HOME | End: 2021-06-07
Payer: COMMERCIAL

## 2021-06-07 PROCEDURE — 97110 THERAPEUTIC EXERCISES: CPT

## 2021-06-07 PROCEDURE — 97140 MANUAL THERAPY 1/> REGIONS: CPT

## 2021-06-07 NOTE — FLOWSHEET NOTE
Outpatient Physical Therapy  [place]           [x] Phone: [phone]   Fax: [phone]  [place]           [] Phone: [phone]   Fax: [phone]        Physical Therapy Daily Treatment Note  Date:  2021    Patient Name:  Tamika Aldrich    :  1970  MRN: [id number]  Restrictions/Precautions:    Diagnosis:   Diagnosis: Adhesive Capsulitis of right shoulder  Date of Injury/Surgery:   Treatment Diagnosis: Treatment Diagnosis: Adhesive Capsulitis of the R shoulder, R shoulder pain    Insurance/Certification information: PT Insurance Information: [employer]- Precert required after 12 visits. 20 visits PCY total   Referring Physician:  Referring Practitioner: Dr. Fidelina Pinon  Next Doctor Visit:    Plan of care signed (Y/N):  pending  Outcome Measure:   Visit# / total visits:     Pain level: 0/10 R shoulder  Goals:     Patient goals : Decrease R shoulder pain, improve ability to reach  Short term goals  Time Frame for Short term goals: 5 weeks  Short term goal 1: Patient will report compliance with HEP. Short term goal 2: Patient will present with improved R shoulder PROM by 10 degrees in all ranges. Short term goal 3: Patient will present with improved R shoulder flexion from 80 degrees AROM to 110 degrees AROM so that she can reach for items in her cabinet. Short term goal 4: Patient will report a decrease in R shoulder pain from 8/10 to 4/10. Short term goal 5: Patient will present with an improved score on the QuickDASH from 43.2% impaired to 33% impaired. Summary of Evaluation: Assessment: Chari Lopez is a 45year old female who presents to physical therapy with adhesive capsulitis of the right shoulder. Objective findings include decreased R shoulder AROM and PROM, decreased R shoulder strength, and postural impairments.  Ron Gunderson will benefit from skilled therapeutic intervention in this setting to decrease pain and improve overall function of the right shoulder. Subjective: Any changes in Ambulatory Summary Sheet? None      Objective:  See below    COVID screening questions were asked and patient attested that there had been no contact or symptoms     AROM flexion 95°    AROM abduction 100°    PROM  flex   110°  PROM abduction 108°   PROM   ER  55    Exercises: (No more than 4 columns)   Exercise/Equipment 5/19/21 #1 5/28/21  #2 6/4/21  #3 6/7/2021 #4            WARM UP                        TABLE                     Supine shoulder flexion AAROM w/cane X10, 10\" hold   10x 10\"    Seated shoulder abduction AAROM w/cane X10, 10\" hold      Shoulder internal rotation behind back AAROM w/cane X5, 10\" hold      Shoulder flexion table walk back X5, 10\" hold   Flexion stretch on table 10x 10\" with STM   Pulleys next 1x10 flexion 1x15 15x 5\" hold          Manual therapy  See below See below See below   STANDING                                                             PROPRIOCEPTION                                          MODALITIES       Vaso  15' 15' --              Other Therapeutic Activities/Education:  Education on anatomy of the shoulder joint and etiology of adhesive capsulitis. Discussed findings from initial evaluation. Discussed pain management techniques using heat to loosen the shoulder prior to stretches. Discussed buying pulley's for home use. Home Exercise Program:  Created and reviewed, exercises listed above      Manual Treatments:  PROM/MOBS to the right shoulder joint complex with TPR/MFR to related hypertonicity x 20'        GT was applied to the  R UT, deltoid, pec, biceps, and triceps  because the assessment demonstrated decreased ROM and tissue mobility . G4 and G5 and sweep stroke  based on the assessment and treatment goals. The patients skin at the end of the treatment showed improved ROM and tissue mobilty.   Overall GT treatment time 13'      30' total  time    Modalities: none      Communication with other providers:  POC sent for cosign on 5/19/21      Assessment:  (Response towards treatment session) (Pain Rating)  Pt tolerated treatment fair today. Pt was able to get more ROM after treatment. Pt will continue to beneifit from more ROM and strengthening as able. Pt rated pain at 2/10 after treatment. Assessment: Priscila Vang is a 45year old female who presents to physical therapy with adhesive capsulitis of the right shoulder. Objective findings include decreased R shoulder AROM and PROM, decreased R shoulder strength, and postural impairments. Maine Vasquez will benefit from skilled therapeutic intervention in this setting to decrease pain and improve overall function of the right shoulder. Plan for Next Session:   Continue to progress towards goals set at IE.       Time In / Time Out:    1445/1545       If [facility] Please Indicate Time In/Out/Total Time  CPT Code Time in Time out Total Time                                                            Total for session             Timed Code/Total Treatment Minutes: 60'/ 61'  2 man ( 30') 2 TE ( 30')     Next Progress Note due:  10th visit      Plan of Care Interventions:  [x] Therapeutic Exercise  [x] Modalities:  [x] Therapeutic Activity     [] Ultrasound  [] Estim  [] Gait Training      [] Cervical Traction [] Lumbar Traction  [x] Neuromuscular Re-education    [x] Cold/hotpack [] Iontophoresis   [x] Instruction in HEP      [x] Vasopneumatic   [] Dry Needling    [x] Manual Therapy               [] Aquatic Therapy              Electronically signed by:  Will Gonzalez PTA    6/7/2021, 2:52 PM     6/7/2021,5:54 PM

## 2021-06-10 ENCOUNTER — OFFICE VISIT (OUTPATIENT)
Dept: ORTHOPEDIC SURGERY | Age: 51
End: 2021-06-10
Payer: COMMERCIAL

## 2021-06-10 ENCOUNTER — HOSPITAL ENCOUNTER (OUTPATIENT)
Dept: PHYSICAL THERAPY | Age: 51
Setting detail: THERAPIES SERIES
Discharge: HOME | End: 2021-06-10
Payer: COMMERCIAL

## 2021-06-10 VITALS
WEIGHT: 202 LBS | HEIGHT: 67 IN | OXYGEN SATURATION: 98 % | RESPIRATION RATE: 16 BRPM | BODY MASS INDEX: 31.71 KG/M2 | HEART RATE: 78 BPM

## 2021-06-10 DIAGNOSIS — M75.01 ADHESIVE CAPSULITIS OF RIGHT SHOULDER: Primary | ICD-10-CM

## 2021-06-10 DIAGNOSIS — S43.421A SPRAIN OF RIGHT ROTATOR CUFF CAPSULE, INITIAL ENCOUNTER: ICD-10-CM

## 2021-06-10 PROCEDURE — 97016 VASOPNEUMATIC DEVICE THERAPY: CPT

## 2021-06-10 PROCEDURE — 99213 OFFICE O/P EST LOW 20 MIN: CPT | Performed by: ORTHOPAEDIC SURGERY

## 2021-06-10 PROCEDURE — 97140 MANUAL THERAPY 1/> REGIONS: CPT

## 2021-06-10 PROCEDURE — 97530 THERAPEUTIC ACTIVITIES: CPT

## 2021-06-10 NOTE — PATIENT INSTRUCTIONS
Continue to work on ROM and strengthening exercises per PT protocol  Continuance of therapy ordered which would be beneficial to patient as discussed with provider  May take OTC pain relievers as needed  Rest,  Ice, and elevate as needed  Weightbearing and activities as tolerated  Follow up in 2 months

## 2021-06-10 NOTE — FLOWSHEET NOTE
Outpatient Physical Therapy  [place]           [x] Phone: [phone]   Fax: [phone]  [place]           [] Phone: [phone]   Fax: [phone]        Physical Therapy Daily Treatment Note  Date:  6/10/2021    Patient Name:  Kenan Nguyễn    :  1970  MRN: [id number]  Restrictions/Precautions:    Diagnosis:   Diagnosis: Adhesive Capsulitis of right shoulder  Date of Injury/Surgery:   Treatment Diagnosis: Treatment Diagnosis: Adhesive Capsulitis of the R shoulder, R shoulder pain    Insurance/Certification information: PT Insurance Information: [employer]- Precert required after 12 visits. 20 visits PCY total   Referring Physician:  Referring Practitioner: Dr. Lauren Truong  Next Doctor Visit:    Plan of care signed (Y/N):   Outcome Measure:   Visit# / total visits:     Pain level: 2-3/10 R shoulder  Goals:     Patient goals : Decrease R shoulder pain, improve ability to reach  Short term goals  Time Frame for Short term goals: 5 weeks  Short term goal 1: Patient will report compliance with HEP. Short term goal 2: Patient will present with improved R shoulder PROM by 10 degrees in all ranges. Short term goal 3: Patient will present with improved R shoulder flexion from 80 degrees AROM to 110 degrees AROM so that she can reach for items in her cabinet. Short term goal 4: Patient will report a decrease in R shoulder pain from 8/10 to 4/10. Short term goal 5: Patient will present with an improved score on the QuickDASH from 43.2% impaired to 33% impaired. Summary of Evaluation: Assessment: Mathew Carlton is a 45year old female who presents to physical therapy with adhesive capsulitis of the right shoulder. Objective findings include decreased R shoulder AROM and PROM, decreased R shoulder strength, and postural impairments.  Sriram Cornelius will benefit from skilled therapeutic intervention in this setting to decrease pain and improve overall function of the right shoulder. Subjective:  Pt stated that her pain was about 2-3/10. Pt stated that she saw Dr. Bhupendra Young prior to therapy today and that he decided against another cortisone shot due to patient having    Any changes in Ambulatory Summary Sheet? None      Objective:      COVID screening questions were asked and patient attested that there had been no contact or symptoms     AROM flexion 95°    AROM abduction 100°    PROM  flex   110°  PROM abduction 110°   PROM   ER  55°    Exercises: (No more than 4 columns)   Exercise/Equipment 5/28/21  #2 6/4/21  #3 6/7/2021 #4 6/10/ 2021 #5            WARM UP                        TABLE       Cane press ups    10x2           Supine shoulder flexion AAROM w/cane   10x 10\"  10x2 5\"    Seated shoulder abduction AAROM w/cane       Shoulder internal rotation behind back AAROM w/cane       Shoulder flexion table walk back   Flexion stretch on table 10x 10\" with STM    Pulleys 1x10 flexion 1x15 15x 5\" hold Flexion and scap   15x 5\" ea           Manual therapy See below See below See below See below   STANDING       SB roll on table     flexion ( PTA scap assist) x 10 x 5\"                                                   PROPRIOCEPTION                                          MODALITIES       Vaso 15' 15' -- 10'               Other Therapeutic Activities/Education:  Education on anatomy of the shoulder joint and etiology of adhesive capsulitis. Discussed findings from initial evaluation. Discussed pain management techniques using heat to loosen the shoulder prior to stretches. Discussed buying pulley's for home use.      Home Exercise Program:  Created and reviewed, exercises listed above      Manual Treatments:  PROM/MOBS to the right shoulder joint complex with TPR/MFR to related hypertonicity x 30'                 Modalities: vaso x 10' to R shoulder with pillow       Communication with other providers:  POC sent for cosign on 5/19/21      Assessment:  (Response towards treatment session) (Pain Rating)  Pt tolerated treatment fair today. Easier to get ROM today, but not real change in measurements today. Pt will continue to beneifit from more ROM and strengthening as able. Pt rated pain at 2/10 after treatment. Assessment: Margo Platt is a 45year old female who presents to physical therapy with adhesive capsulitis of the right shoulder. Objective findings include decreased R shoulder AROM and PROM, decreased R shoulder strength, and postural impairments. Corrinne Bibles will benefit from skilled therapeutic intervention in this setting to decrease pain and improve overall function of the right shoulder. Plan for Next Session:   Continue to progress towards goals set at IE.       Time In / Time Out:    2918/1541       If [facility] Please Indicate Time In/Out/Total Time  CPT Code Time in Time out Total Time                                                            Total for session             Timed Code/Total Treatment Minutes: 53'/63' 2 man (30') 1 TE (23') 1 vaso (10')     Next Progress Note due:  10th visit      Plan of Care Interventions:  [x] Therapeutic Exercise  [x] Modalities:  [x] Therapeutic Activity     [] Ultrasound  [] Estim  [] Gait Training      [] Cervical Traction [] Lumbar Traction  [x] Neuromuscular Re-education    [x] Cold/hotpack [] Iontophoresis   [x] Instruction in HEP      [x] Vasopneumatic   [] Dry Needling    [x] Manual Therapy               [] Aquatic Therapy              Electronically signed by:  Graeme Guerra PTA    6/10/2021, 12:56 PM       6/10/2021,6:08 PM

## 2021-06-10 NOTE — PROGRESS NOTES
Patient returns to the office for a follow up on a steroid injection that was administered on 4/29/21 with pain reported in office at a 4/10. She states that it took her a while to start noticing relief from the injection, but now her symptoms have resolved enough to allow her to comfortably sleep at night and has had improvement in her ROM. She continues to work with therapy on stretching and ROM exercises with some continued discomfort and pain upon raising the arm above the head, but overall progressing well. No new injury or worsening of symptoms reported.

## 2021-06-14 ENCOUNTER — HOSPITAL ENCOUNTER (OUTPATIENT)
Dept: PHYSICAL THERAPY | Age: 51
Setting detail: THERAPIES SERIES
Discharge: HOME | End: 2021-06-14
Payer: COMMERCIAL

## 2021-06-14 PROCEDURE — 97110 THERAPEUTIC EXERCISES: CPT

## 2021-06-14 PROCEDURE — 97140 MANUAL THERAPY 1/> REGIONS: CPT

## 2021-06-14 ASSESSMENT — ENCOUNTER SYMPTOMS
CHEST TIGHTNESS: 0
COLOR CHANGE: 0
SHORTNESS OF BREATH: 0

## 2021-06-14 NOTE — PROGRESS NOTES
6/10/2021   Chief Complaint   Patient presents with    Shoulder Pain     right        History of Present Illness: Zac Reed is a 45 y.o. female who returns today for follow-up of her right shoulder pain, stiffness, and weakness. She has noticed some improvement with the injection and physical therapy. She feels that her shoulder has noticed increased function and range of motion but she is still very limited because of ongoing stiffness and difficulty with reaching out over her body or away from her body. She does feel improvements with therapy and would like to continue additional therapy visits    Patient returns to the office for a follow up on a steroid injection that was administered on 4/29/21 with pain reported in office at a 4/10. She states that it took her a while to start noticing relief from the injection, but now her symptoms have resolved enough to allow her to comfortably sleep at night and has had improvement in her ROM. She continues to work with therapy on stretching and ROM exercises with some continued discomfort and pain upon raising the arm above the head, but overall progressing well. No new injury or worsening of symptoms reported. Medical History  Patient's medications, allergies, past medical, surgical, social and family histories were reviewed and updated as appropriate. Review of Systems   Constitutional: Negative for activity change and fever. Respiratory: Negative for chest tightness and shortness of breath. Cardiovascular: Negative for chest pain. Skin: Negative for color change. Neurological: Negative for dizziness. Psychiatric/Behavioral: The patient is not nervous/anxious.                                                 Examination:  General Exam:  Vitals: Pulse 78   Resp 16   Ht 5' 7\" (1.702 m)   Wt 202 lb (91.6 kg)   SpO2 98%   BMI 31.64 kg/m²    Physical Exam     Right Upper Extremity:    There is improved but persistent moderate tenderness diffusely throughout the shoulder. Tenderness to palpation is maximal over the anterior and lateral aspects of the shoulder specifically along the greater tuberosity and proximal biceps tendon. Active range of motion is very limited due to pain. Forward elevation present to 90 degrees, abduction present to 70 degrees, external rotation 15 degrees, internal rotation to the greater trochanter. Passive range of motion is nearly equal    Rotator cuff testing is difficult due to pain. Strength 4/5 forward elevation and abduction of the shoulder. There is breakaway to strength testing due to pain. Positive empty can test.    Negative drop arm sign. Positive Garcia and Neer signs. Mild pain at the acromioclavicular joint with crossarm test.    Skin is intact. Sensation is intact in the upper extremity. 2+ radial pulse. No edema. No muscle atrophy. Office Procedures:  Orders Placed This Encounter   Procedures   [facility]     Referral Priority:   Routine     Referral Type:   Eval and Treat     Referral Reason:   Specialty Services Required     Requested Specialty:   Occupational Therapy     Number of Visits Requested:   1       Assessment and Plan  1. Right shoulder adhesive capsulitis    She has made good progress with the physical therapy and injection however she still has significant stiffness and weakness in the right shoulder. I have explained to her that this is a prolonged process and will likely benefit from continued physical therapy and home exercise program.  She may have met a lot of her allotted physical therapy visits through her insurance. I recommended that we request additional visits so that she may continue to make improvements with her function and regain range of motion at the shoulder. I have ordered another session of physical therapy and we discussed other conservative treatments.   Continue with over-the-counter anti-inflammatories, massage, icing, and home stretching.     Follow-up in 2 months for check of her progress            Electronically signed by Carin Garcia MD on 6/14/2021 at 2:12 PM

## 2021-06-14 NOTE — FLOWSHEET NOTE
Outpatient Physical Therapy  [place]           [x] Phone: [phone]   Fax: [phone]  [facility]           [] Phone: [phone]   Fax: [phone]        Physical Therapy Daily Treatment Note  Date:  2021    Patient Name:  Nena Carlton    :  1970  MRN: [id number]  Restrictions/Precautions:    Diagnosis:   Diagnosis: Adhesive Capsulitis of right shoulder  Date of Injury/Surgery:   Treatment Diagnosis: Treatment Diagnosis: Adhesive Capsulitis of the R shoulder, R shoulder pain    Insurance/Certification information: PT Insurance Information: [employer]- Precert required after 12 visits. 20 visits PCY total   Referring Physician:  Referring Practitioner: Dr. Miles Laughlin  Next Doctor Visit:    Plan of care signed (Y/N):   Outcome Measure:   Visit# / total visits:     Pain level: 2/10 R shoulder  Goals:     Patient goals : Decrease R shoulder pain, improve ability to reach  Short term goals  Time Frame for Short term goals: 5 weeks  Short term goal 1: Patient will report compliance with HEP. Short term goal 2: Patient will present with improved R shoulder PROM by 10 degrees in all ranges. Short term goal 3: Patient will present with improved R shoulder flexion from 80 degrees AROM to 110 degrees AROM so that she can reach for items in her cabinet. Short term goal 4: Patient will report a decrease in R shoulder pain from 8/10 to 4/10. Short term goal 5: Patient will present with an improved score on the QuickDASH from 43.2% impaired to 33% impaired. Summary of Evaluation: Assessment: Angela Collins is a 45year old female who presents to physical therapy with adhesive capsulitis of the right shoulder. Objective findings include decreased R shoulder AROM and PROM, decreased R shoulder strength, and postural impairments.  Noberto Primrose will benefit from skilled therapeutic intervention in this setting to decrease pain and improve overall function of the right shoulder. Subjective:  Dr. Lyubov Shah decided against an additional cortisone shot. Tanisha Irizarry reports that she has been trying to swim in her pool with improved shoulder motion. Any changes in Ambulatory Summary Sheet? None      Objective:      COVID screening questions were asked and patient attested that there had been no contact or symptoms      PROM  flex   115°  PROM abduction 95°           Exercises: (No more than 4 columns)   Exercise/Equipment 6/7/2021 #4 6/10/ 2021 #5 6/14/21 #6           WARM UP                     TABLE      Cane press ups  10x2  10x2          Supine shoulder flexion AAROM w/cane 10x 10\"  10x2 5\"  10x2 5\"    Seated shoulder abduction AAROM w/cane      Shoulder internal rotation behind back AAROM w/cane      Shoulder flexion table walk back Flexion stretch on table 10x 10\" with STM     Pulleys 15x 5\" hold Flexion and scap   15x 5\" ea  Flexion and scap   2x10 5\" ea          Manual therapy See below See below See below   STANDING      SB roll on table   flexion ( PTA scap assist) x 10 x 5\"  flexion ( PTA scap assist) x 10 x 5\"                                             PROPRIOCEPTION                                    MODALITIES      Vaso -- 10'               Other Therapeutic Activities/Education:  Education on anatomy of the shoulder joint and etiology of adhesive capsulitis. Discussed findings from initial evaluation. Discussed pain management techniques using heat to loosen the shoulder prior to stretches. Discussed buying pulley's for home use. Home Exercise Program:  Created and reviewed, exercises listed above      Manual Treatments:  PROM/MOBS to the right shoulder joint complex with TPR/MFR to related hypertonicity.  Scapular mobes with patient in sidelying x 30'        Modalities:       Communication with other providers:  POC sent for cosign on 5/19/21      Assessment:  (Response towards treatment session) (Pain Rating)  Pt with some improvements in regards to PROM

## 2021-06-17 ENCOUNTER — HOSPITAL ENCOUNTER (OUTPATIENT)
Dept: PHYSICAL THERAPY | Age: 51
Setting detail: THERAPIES SERIES
Discharge: HOME | End: 2021-06-17
Payer: COMMERCIAL

## 2021-06-17 PROCEDURE — 97140 MANUAL THERAPY 1/> REGIONS: CPT

## 2021-06-17 PROCEDURE — 97016 VASOPNEUMATIC DEVICE THERAPY: CPT

## 2021-06-17 PROCEDURE — 97110 THERAPEUTIC EXERCISES: CPT

## 2021-06-17 NOTE — FLOWSHEET NOTE
Outpatient Physical Therapy  [place]           [x] Phone: [phone]   Fax: [phone]  [place]           [] Phone: [phone]   Fax: [phone]        Physical Therapy Daily Treatment Note  Date:  2021    Patient Name:  Rishi Saldana    :  1970  MRN: [id number]  Restrictions/Precautions:    Diagnosis:   Diagnosis: Adhesive Capsulitis of right shoulder  Date of Injury/Surgery:   Treatment Diagnosis: Treatment Diagnosis: Adhesive Capsulitis of the R shoulder, R shoulder pain    Insurance/Certification information: PT Insurance Information: [employer]- Precert required after 12 visits. 20 visits PCY total   Referring Physician:  Referring Practitioner: Dr. Sarmad Goodman  Next Doctor Visit:    Plan of care signed (Y/N):   Outcome Measure:   Visit# / total visits:     Pain level: 0/10 R shoulder  Goals:     Patient goals : Decrease R shoulder pain, improve ability to reach  Short term goals  Time Frame for Short term goals: 5 weeks  Short term goal 1: Patient will report compliance with HEP. Short term goal 2: Patient will present with improved R shoulder PROM by 10 degrees in all ranges. Short term goal 3: Patient will present with improved R shoulder flexion from 80 degrees AROM to 110 degrees AROM so that she can reach for items in her cabinet. Short term goal 4: Patient will report a decrease in R shoulder pain from 8/10 to 4/10. Short term goal 5: Patient will present with an improved score on the QuickDASH from 43.2% impaired to 33% impaired. Summary of Evaluation: Assessment: Tracey Ferrera is a 45year old female who presents to physical therapy with adhesive capsulitis of the right shoulder. Objective findings include decreased R shoulder AROM and PROM, decreased R shoulder strength, and postural impairments.  Ruthanna Ormond will benefit from skilled therapeutic intervention in this setting to decrease pain and improve overall function of the right shoulder. Subjective:  Pt states no pain but discomfort in R shoulder since last session that increases with movement. Pt stated that she has been trying to do her HEP, but not as often as she  should. Any changes in Ambulatory Summary Sheet? None      Objective:      COVID screening questions were asked and patient attested that there had been no contact or symptoms    Pt required min VC to correct form during cane press ups and seated cane exercises. Exercises: (No more than 4 columns)   Exercise/Equipment 6/7/2021 #4 6/10/ 2021 #5 6/14/21 #6 6/17/2021 #7            WARM UP                        TABLE       Cane press ups  10x2  10x2  10 x 2    Supine shoulder ER AAROM w/cane    10 x 2 5\" hold   Supine shoulder flexion AAROM w/cane 10x 10\"  10x2 5\"  10x2 5\"  10 x 2 5\" hold   Seated shoulder abduction AAROM w/cane    10 x 2 5\" hold  scaption   Shoulder internal rotation behind back AAROM w/cane    --   Shoulder flexion table walk back Flexion stretch on table 10x 10\" with STM   --   Pulleys 15x 5\" hold Flexion and scap   15x 5\" ea  Flexion and scap   2x10 5\" ea  Flexion and scap   2x10 5\" ea    Seated shoulder ER AAROM w/cane    10 x 2 5\" hold   Manual therapy See below See below See below See below    STANDING       SB roll on table   flexion ( PTA scap assist) x 10 x 5\"  flexion ( PTA scap assist) x 10 x 5\"  --                                                  PROPRIOCEPTION                                          MODALITIES       Vaso -- 10'   10'              Other Therapeutic Activities/Education:  Education on anatomy of the shoulder joint and etiology of adhesive capsulitis. Discussed findings from initial evaluation. Discussed pain management techniques using heat to loosen the shoulder prior to stretches. Discussed buying pulley's for home use.      Home Exercise Program:  Created and reviewed, exercises listed above      Manual Treatments:  PROM/MOBS to the right shoulder joint complex all directions with TPR/MFR to related hypertonicity. Scapular mobs with patient in sidelying x 30'       Modalities: vaso x 10' to R shoulder with pillow       Communication with other providers:  POC sent for cosign on 5/19/21      Assessment:   Pt tolerated treatment fair today. Pt continues to have poor mechanics with cane exercises and needs manual assist to maintain good alignment. Pt showed slight increased mobility post mobs. Pt stated that ER AROM was especially painful. Pt rated pain at 0/10 after vaso. Pt will continue to benefit from more ROM/ strengthening. Assessment: Kevin Collins is a 45year old female who presents to physical therapy with adhesive capsulitis of the right shoulder. Objective findings include decreased R shoulder AROM and PROM, decreased R shoulder strength, and postural impairments. Ana Laura Lance will benefit from skilled therapeutic intervention in this setting to decrease pain and improve overall function of the right shoulder. Plan for Next Session:   Continue to progress towards goals set at IE. Time In / Time Out:  7760-6027     If [facility] Please Indicate Time In/Out/Total Time  CPT Code Time in Time out Total Time                                                            Total for session             Timed Code/Total Treatment Minutes:    2 man (30') 1 vaso (10') 1 TE (20')    Next Progress Note due:  10th visit      Plan of Care Interventions:  [x] Therapeutic Exercise  [x] Modalities:  [x] Therapeutic Activity     [] Ultrasound  [] Estim  [] Gait Training      [] Cervical Traction [] Lumbar Traction  [x] Neuromuscular Re-education    [x] Cold/hotpack [] Iontophoresis   [x] Instruction in HEP      [x] Vasopneumatic   [] Dry Needling    [x] Manual Therapy               [] Aquatic Therapy              Electronically signed by:  Kaiden Gonzalez PTA     6/17/2021, 11:13 AM     Eren Fitzgerald      6/17/2021,5:30 PM

## 2021-06-22 ENCOUNTER — HOSPITAL ENCOUNTER (OUTPATIENT)
Dept: PHYSICAL THERAPY | Age: 51
Discharge: HOME | End: 2021-06-22

## 2021-06-22 NOTE — FLOWSHEET NOTE
Physical Therapy  Cancellation/No-show Note  Patient Name:  Calixto Rodríguez  :  1970   Date:  2021  Cancelled visits to date: 1  No-shows to date: 0    For today's appointment patient:  [x]  Cancelled  []  Rescheduled appointment  []  No-show     Reason given by patient:  [x]  Patient ill  []  Conflicting appointment  []  No transportation    []  Conflict with work  []  No reason given  []  Other:     Comments:      Electronically signed by:  Vanessa Martinez PTA,

## 2021-06-24 ENCOUNTER — HOSPITAL ENCOUNTER (OUTPATIENT)
Dept: PHYSICAL THERAPY | Age: 51
Setting detail: THERAPIES SERIES
Discharge: HOME | End: 2021-06-24
Payer: COMMERCIAL

## 2021-06-24 PROCEDURE — 97140 MANUAL THERAPY 1/> REGIONS: CPT

## 2021-06-24 PROCEDURE — 97016 VASOPNEUMATIC DEVICE THERAPY: CPT

## 2021-06-24 PROCEDURE — 97110 THERAPEUTIC EXERCISES: CPT

## 2021-06-24 NOTE — FLOWSHEET NOTE
Outpatient Physical Therapy  [place]           [x] Phone: [phone]   Fax: [phone]  [place]           [] Phone: [phone]   Fax: [phone]        Physical Therapy Daily Treatment Note  Date:  2021    Patient Name:  Yenny Fields    :  1970  MRN: [id number]  Restrictions/Precautions:    Diagnosis:   Diagnosis: Adhesive Capsulitis of right shoulder  Date of Injury/Surgery:   Treatment Diagnosis: Treatment Diagnosis: Adhesive Capsulitis of the R shoulder, R shoulder pain    Insurance/Certification information: PT Insurance Information: [employer]- Precert required after 12 visits. 20 visits PCY total   Referring Physician:  Referring Practitioner: Dr. Obi Santos  Next Doctor Visit:    Plan of care signed (Y/N):   Outcome Measure:   Visit# / total visits:   Pain level: 0/10 R shoulder  Goals:     Patient goals : Decrease R shoulder pain, improve ability to reach  Short term goals  Time Frame for Short term goals: 5 weeks  Short term goal 1: Patient will report compliance with HEP. Short term goal 2: Patient will present with improved R shoulder PROM by 10 degrees in all ranges. Short term goal 3: Patient will present with improved R shoulder flexion from 80 degrees AROM to 110 degrees AROM so that she can reach for items in her cabinet. Short term goal 4: Patient will report a decrease in R shoulder pain from 8/10 to 4/10. Short term goal 5: Patient will present with an improved score on the QuickDASH from 43.2% impaired to 33% impaired. Summary of Evaluation: Assessment: Karin Tristan is a 45year old female who presents to physical therapy with adhesive capsulitis of the right shoulder. Objective findings include decreased R shoulder AROM and PROM, decreased R shoulder strength, and postural impairments. Gretel Lance will benefit from skilled therapeutic intervention in this setting to decrease pain and improve overall function of the right shoulder.     Any changes in Ambulatory Summary Sheet? None    Subjective: Pt stated that she really isn't having any pain today. Pt stated that she just doesn't have much ROM. Objective:      COVID screening questions were asked and patient attested that there had been no contact or symptoms    Pt required min VC to correct form during cane press ups and seated cane exercises. PROM  flex   126°  PROM abduction 106°  PROM ER 53°    Exercises: (No more than 4 columns)   Exercise/Equipment 6/7/2021 #4 6/10/ 2021 #5 6/14/21 #6 6/17/2021 #7 6/24/2021 #8             WARM UP                           TABLE        Cane press ups  10x2  10x2  10 x 2  10x2   Supine shoulder ER AAROM w/cane    10 x 2 5\" hold    Supine shoulder flexion AAROM w/cane 10x 10\"  10x2 5\"  10x2 5\"  10 x 2 5\" hold 10 x 2 5\" hold   Seated shoulder abduction AAROM w/cane    10 x 2 5\" hold  scaption standing   Shoulder internal rotation behind back AAROM w/cane    --    Shoulder flexion table walk back Flexion stretch on table 10x 10\" with STM   --    Pulleys 15x 5\" hold Flexion and scap   15x 5\" ea  Flexion and scap   2x10 5\" ea  Flexion and scap   2x10 5\" ea  Flexion and scap   2x10 5\" ea    Seated shoulder ER AAROM w/cane    10 x 2 5\" hold    Manual therapy See below See below See below See below  See below   STANDING        SB roll on table   flexion ( PTA scap assist) x 10 x 5\"  flexion ( PTA scap assist) x 10 x 5\"  --    Shoulder abduction w/ cane     10x2                                                PROPRIOCEPTION                                                MODALITIES        Vaso -- 10'   10' 10'                Other Therapeutic Activities/Education:  Education on anatomy of the shoulder joint and etiology of adhesive capsulitis. Discussed findings from initial evaluation. Discussed pain management techniques using heat to loosen the shoulder prior to stretches. Discussed buying pulley's for home use.      Home Exercise Program:  Created and reviewed, exercises listed above      Manual Treatments:  PROM/MOBS to the right shoulder joint complex all directions with TPR/MFR to related hypertonicity. Scapular mobs with patient in sidelying x 30'       Modalities: vaso x 10' to R shoulder with pillow       Communication with other providers:  POC sent for cosign on 5/19/21      Assessment:   Pt tolerated treatment fair today. Pt was able to make some gains in flexion and abduction. Pt rated pain at 0-1/10 ,but stated that it was more irritated than pain. Pt will continue to benefit from more ROM and strengthening as able. Assessment: Johanen Rojas is a 45year old female who presents to physical therapy with adhesive capsulitis of the right shoulder. Objective findings include decreased R shoulder AROM and PROM, decreased R shoulder strength, and postural impairments. Miguel Angel will benefit from skilled therapeutic intervention in this setting to decrease pain and improve overall function of the right shoulder. Plan for Next Session:   Continue to progress towards goals set at IE.       Time In / Time Out:  1135/1235     If [facility] Please Indicate Time In/Out/Total Time  CPT Code Time in Time out Total Time                                                            Total for session             Timed Code/Total Treatment Minutes: 50'/ 61'   2 man (30') 1 vaso (10') 1 TE (20')    Next Progress Note due:  10th visit      Plan of Care Interventions:  [x] Therapeutic Exercise  [x] Modalities:  [x] Therapeutic Activity     [] Ultrasound  [] Estim  [] Gait Training      [] Cervical Traction [] Lumbar Traction  [x] Neuromuscular Re-education    [x] Cold/hotpack [] Iontophoresis   [x] Instruction in HEP      [x] Vasopneumatic   [] Dry Needling    [x] Manual Therapy               [] Aquatic Therapy              Electronically signed by:  Sg Munson PTA     6/24/2021, 10:40 AM           6/24/2021,6:58 PM

## 2021-06-29 ENCOUNTER — HOSPITAL ENCOUNTER (OUTPATIENT)
Dept: PHYSICAL THERAPY | Age: 51
Setting detail: THERAPIES SERIES
Discharge: HOME | End: 2021-06-29
Payer: COMMERCIAL

## 2021-06-29 PROCEDURE — 97016 VASOPNEUMATIC DEVICE THERAPY: CPT

## 2021-06-29 PROCEDURE — 97110 THERAPEUTIC EXERCISES: CPT

## 2021-06-29 PROCEDURE — 97140 MANUAL THERAPY 1/> REGIONS: CPT

## 2021-06-29 PROCEDURE — 97530 THERAPEUTIC ACTIVITIES: CPT

## 2021-06-29 NOTE — FLOWSHEET NOTE
Outpatient Physical Therapy  [place]           [x] Phone: [phone]   Fax: [phone]  [place]           [] Phone: [phone]   Fax: [phone]        Physical Therapy Daily Treatment Note  Date:  2021    Patient Name:  Werner Card    :  1970  MRN: [id number]  Restrictions/Precautions:    Diagnosis:   Diagnosis: Adhesive Capsulitis of right shoulder  Date of Injury/Surgery:   Treatment Diagnosis: Treatment Diagnosis: Adhesive Capsulitis of the R shoulder, R shoulder pain    Insurance/Certification information: PT Insurance Information: [employer]- Precert required after 12 visits. 20 visits PCY total   Referring Physician:  Referring Practitioner: Dr. Bhupendra Young  Next Doctor Visit:    Plan of care signed (Y/N):   Outcome Measure:   Visit# / total visits:   Pain level: 4/10 neck and  R shoulder  Goals:     Patient goals : Decrease R shoulder pain, improve ability to reach  Short term goals  Time Frame for Short term goals: 5 weeks  Short term goal 1: Patient will report compliance with HEP. Short term goal 2: Patient will present with improved R shoulder PROM by 10 degrees in all ranges. Short term goal 3: Patient will present with improved R shoulder flexion from 80 degrees AROM to 110 degrees AROM so that she can reach for items in her cabinet. Short term goal 4: Patient will report a decrease in R shoulder pain from 8/10 to 4/10. Short term goal 5: Patient will present with an improved score on the QuickDASH from 43.2% impaired to 33% impaired. Summary of Evaluation: Assessment: Rianna Florence is a 45year old female who presents to physical therapy with adhesive capsulitis of the right shoulder. Objective findings include decreased R shoulder AROM and PROM, decreased R shoulder strength, and postural impairments. Sabiha Gupta will benefit from skilled therapeutic intervention in this setting to decrease pain and improve overall function of the right shoulder.     Any changes in Ambulatory Summary Sheet? None    Subjective: Pt stated that she had increased neck pain and a headache after her last visit. Pt stated that the headache developed into a migraine. Pt rated pain at 4/10 today in neck and R shoulder. Pt stated that Dr. Jacqui Rubi was trying to request for additional visits from her insurance. Objective:      COVID screening questions were asked and patient attested that there had been no contact or symptoms  Noted increased edema and tightness in biceps/ deltoid today    PROM  flex   130°   PROM abduction 121°  PROM ER 50°    Exercises: (No more than 4 columns)   Exercise/Equipment 6/14/21 #6 6/17/2021 #7 6/24/2021 #8 6/29/2021 #9            WARM UP                        TABLE       Cane press ups 10x2  10 x 2  10x2 10x AROM   Supine shoulder ER AAROM w/cane  10 x 2 5\" hold  man   Supine shoulder flexion AAROM w/cane 10x2 5\"  10 x 2 5\" hold 10 x 2 5\" hold 10x2 5\"   10x AROM   Seated shoulder abduction AAROM w/cane  10 x 2 5\" hold  scaption standing    Shoulder internal rotation behind back AAROM w/cane  --     Shoulder flexion table walk back  --     Pulleys Flexion and scap   2x10 5\" ea  Flexion and scap   2x10 5\" ea  Flexion and scap   2x10 5\" ea  --   Seated chin tucks    10x2 5\"    Seated shoulder ER AAROM w/cane  10 x 2 5\" hold     Manual therapy See below See below  See below See below   STANDING       SB roll on table  flexion ( PTA scap assist) x 10 x 5\"  --     Shoulder abduction w/ cane   10x2                                            PROPRIOCEPTION                                          MODALITIES       Vaso  10' 10'  10'    HP to neck     10'        Other Therapeutic Activities/Education:  Education on anatomy of the shoulder joint and etiology of adhesive capsulitis. Discussed findings from initial evaluation. Discussed pain management techniques using heat to loosen the shoulder prior to stretches. Discussed buying pulley's for home use.      Home Exercise Program:  Created and reviewed, exercises listed above      Manual Treatments:  PROM/MOBS to the right shoulder, scapular mobs with patient in sidelying, and \"milking\" edema massage to R arm at 90° and gentle manual distaction, and STM to neck x 35'       Modalities: vaso x 10' to R shoulder with pillow       Communication with other providers:  POC sent for cosign on 5/19/21      Assessment:   Pt tolerated treatment fair today. Pt was able to get more pain-free motion after manual treatment. Pt rated pain at 2/10 in neck and shoulder after HP/ vaso. Pt will benefit from more ROM and strengthening as able. Assessment: Rachelle Rodriguez is a 45year old female who presents to physical therapy with adhesive capsulitis of the right shoulder. Objective findings include decreased R shoulder AROM and PROM, decreased R shoulder strength, and postural impairments. Wilver Spicer will benefit from skilled therapeutic intervention in this setting to decrease pain and improve overall function of the right shoulder.       Plan for Next Session:   Continue to progress towards goals set at IE.  DNT????      Time In / Time Out: 1530/1640     If [facility] Please Indicate Time In/Out/Total Time  CPT Code Time in Time out Total Time                                                            Total for session             Timed Code/Total Treatment Minutes: 60'/70' 2 man (35') 1 vaso (10') 1 TA (10') 1 TE (15')     Next Progress Note due:  10th visit      Plan of Care Interventions:  [x] Therapeutic Exercise  [x] Modalities:  [x] Therapeutic Activity     [] Ultrasound  [] Estim  [] Gait Training      [] Cervical Traction [] Lumbar Traction  [x] Neuromuscular Re-education    [x] Cold/hotpack [] Iontophoresis   [x] Instruction in HEP      [x] Vasopneumatic   [] Dry Needling    [x] Manual Therapy               [] Aquatic Therapy              Electronically signed by:  Chidi Franklin PTA     6/29/2021, 10:58 AM     6/29/2021,7:20 PM

## 2021-07-02 ENCOUNTER — HOSPITAL ENCOUNTER (OUTPATIENT)
Dept: PHYSICAL THERAPY | Age: 51
Setting detail: THERAPIES SERIES
Discharge: HOME | End: 2021-07-02
Payer: COMMERCIAL

## 2021-07-02 PROCEDURE — 97140 MANUAL THERAPY 1/> REGIONS: CPT

## 2021-07-02 PROCEDURE — 97016 VASOPNEUMATIC DEVICE THERAPY: CPT

## 2021-07-02 PROCEDURE — 97110 THERAPEUTIC EXERCISES: CPT

## 2021-07-02 NOTE — FLOWSHEET NOTE
Outpatient Physical Therapy  [place]           [x] Phone: [phone]   Fax: [phone]  [place]           [] Phone: [phone]   Fax: [phone]        Physical Therapy Daily Treatment Note  Date:  2021    Patient Name:  Eduin Akbar    :  1970  MRN: [id number]  Restrictions/Precautions:    Diagnosis:   Diagnosis: Adhesive Capsulitis of right shoulder  Date of Injury/Surgery:   Treatment Diagnosis: Treatment Diagnosis: Adhesive Capsulitis of the R shoulder, R shoulder pain    Insurance/Certification information: PT Insurance Information: [employer]- Precert required after 12 visits. 20 visits PCY total   Referring Physician:  Referring Practitioner: Dr. Sierra Richardson  Next Doctor Visit:    Plan of care signed (Y/N):   Outcome Measure:   Visit# / total visits: 10/12  Pain level: 0-1/10 neck and  0-1/10 R shoulder  Goals:     Patient goals : Decrease R shoulder pain, improve ability to reach  Short term goals  Time Frame for Short term goals: 5 weeks  Short term goal 1: Patient will report compliance with HEP. Short term goal 2: Patient will present with improved R shoulder PROM by 10 degrees in all ranges. Short term goal 3: Patient will present with improved R shoulder flexion from 80 degrees AROM to 110 degrees AROM so that she can reach for items in her cabinet. Short term goal 4: Patient will report a decrease in R shoulder pain from 8/10 to 4/10. Short term goal 5: Patient will present with an improved score on the QuickDASH from 43.2% impaired to 33% impaired. Summary of Evaluation: Assessment: Elly Conrad is a 45year old female who presents to physical therapy with adhesive capsulitis of the right shoulder. Objective findings include decreased R shoulder AROM and PROM, decreased R shoulder strength, and postural impairments.  Kassy Alexis will benefit from skilled therapeutic intervention in this setting to decrease pain and improve overall function of the right shoulder. Any changes in Ambulatory Summary Sheet? None    Subjective: Pt stated that she is not really having any shoulder pain, but she is in the middle of a full blown migraine. Pt stated that she has taken her migraine medicine and that it might make her a little \"goofy\". Objective:      COVID screening questions were asked and patient attested that there had been no contact or symptoms  The patient received  explanation for the benefits and rational in utilizing the Graston Technique for their soft tissues limitations. Patient does not have any Red flags or absolute contraindications. Patient was instructed the use of the Graston Instruments on the skin may cause some discomfort/pain [place] of the skin, bruising or Petechiae. Patient understands these risks and has agreed to continue with the intervention.             PROM  flex   130°   PROM abduction 121°  PROM ER 50°    Exercises: (No more than 4 columns)   Exercise/Equipment 6/17/2021 #7 6/24/2021 #8 6/29/2021 #9 7/2/2021 #10            WARM UP                        TABLE       Cane press ups 10 x 2  10x2 10x AROM 10x AROM   Supine shoulder ER AAROM w/cane 10 x 2 5\" hold  man S/L 10x2    Supine shoulder flexion AAROM w/cane 10 x 2 5\" hold 10 x 2 5\" hold 10x2 5\"   10x AROM AROM 10x    Seated shoulder abduction AAROM w/cane 10 x 2 5\" hold  scaption standing     Concentric circles CW/CCW     10x2 ea dir   Shoulder internal rotation behind back AAROM w/cane --      Shoulder flexion table walk back --      Pulleys Flexion and scap   2x10 5\" ea  Flexion and scap   2x10 5\" ea  -- Flexion and scap   2x10 5\" ea    Seated chin tucks   10x2 5\"     Seated shoulder ER AAROM w/cane 10 x 2 5\" hold      Manual therapy See below  See below See below See below   STANDING       SB roll on table  --      Shoulder abduction w/ cane  10x2                                             PROPRIOCEPTION                                          MODALITIES       Vaso 10' 10' 10'  10'    HP to neck    10'         Other Therapeutic Activities/Education:  Education on anatomy of the shoulder joint and etiology of adhesive capsulitis. Discussed findings from initial evaluation. Discussed pain management techniques using heat to loosen the shoulder prior to stretches. Discussed buying pulley's for home use. Home Exercise Program:  Created and reviewed, exercises listed above      Manual Treatments:  GT was applied to the  R deltoid/ biceps, and UT because the assessment demonstrated decreased tissue mobiltiy and decreased ROM . G-4 with sweep stroke technique was  utilized  based on the assessment and treatment goals. The patients skin at the end of the treatment showed mild redness and improved tissue mobility and ROM . Overall GT treatment time 13'  PROM/MOBS to the right shoulder, scapular mobs with patient in sidelying, and \"milking\" edema massage to R arm at 90° and gentle manual distaction, and STM to neck x 25'       Modalities: vaso x 10' to R shoulder with pillow       Communication with other providers:  POC sent for cosign on 5/19/21      Assessment:   Pt tolerated treatment fair today. Pt was able to get more pain-free motion AROM  after manual treatment. Pt rated pain at 0/10 in neck and shoulder after  vaso. Pt will benefit from more ROM and strengthening as able. Assessment: Gery Jeans is a 45year old female who presents to physical therapy with adhesive capsulitis of the right shoulder. Objective findings include decreased R shoulder AROM and PROM, decreased R shoulder strength, and postural impairments. Brian Palmer will benefit from skilled therapeutic intervention in this setting to decrease pain and improve overall function of the right shoulder.       Plan for Next Session:   Continue to progress towards goals set at IE.  DNT????      Time In / Time Out: [id number]     If [facility] Please Indicate Time In/Out/Total Time  CPT Code Time in Time out Total Time Total for session             Timed Code/Total Treatment Minutes: 58'/68' 2 man (35') 1 vaso (10') 2 TE (23')     Next Progress Note due:  10th visit      Plan of Care Interventions:  [x] Therapeutic Exercise  [x] Modalities:  [x] Therapeutic Activity     [] Ultrasound  [] Estim  [] Gait Training      [] Cervical Traction [] Lumbar Traction  [x] Neuromuscular Re-education    [x] Cold/hotpack [] Iontophoresis   [x] Instruction in HEP      [x] Vasopneumatic   [] Dry Needling    [x] Manual Therapy               [] Aquatic Therapy              Electronically signed by:  Lan Jose PTA     7/2/2021, 10:12 AM      7/2/2021,4:47 PM

## 2021-07-06 ENCOUNTER — HOSPITAL ENCOUNTER (OUTPATIENT)
Dept: PHYSICAL THERAPY | Age: 51
Setting detail: THERAPIES SERIES
Discharge: HOME | End: 2021-07-06
Payer: COMMERCIAL

## 2021-07-06 PROCEDURE — 97110 THERAPEUTIC EXERCISES: CPT

## 2021-07-06 PROCEDURE — 97140 MANUAL THERAPY 1/> REGIONS: CPT

## 2021-07-06 NOTE — FLOWSHEET NOTE
Outpatient Physical Therapy  [place]           [x] Phone: [phone]   Fax: [phone]  [place]           [] Phone: [phone]   Fax: [phone]        Physical Therapy Daily Treatment Note  Date:  2021    Patient Name:  Chloe Shah    :  1970  MRN: [id number]  Restrictions/Precautions:    Diagnosis:   Diagnosis: Adhesive Capsulitis of right shoulder  Date of Injury/Surgery:   Treatment Diagnosis: Treatment Diagnosis: Adhesive Capsulitis of the R shoulder, R shoulder pain    Insurance/Certification information: PT Insurance Information: [employer]- Precert required after 12 visits. 20 visits PCY total   Referring Physician:  Referring Practitioner: Dr. Jeison Nguyễn  Next Doctor Visit:    Plan of care signed (Y/N):   Outcome Measure:   Visit# / total visits:   Pain level: 0/10 \"slight irritation\"  Goals:     Patient goals : Decrease R shoulder pain, improve ability to reach  Short term goals  Time Frame for Short term goals: 5 weeks  Short term goal 1: Patient will report compliance with HEP. Short term goal 2: Patient will present with improved R shoulder PROM by 10 degrees in all ranges. Short term goal 3: Patient will present with improved R shoulder flexion from 80 degrees AROM to 110 degrees AROM so that she can reach for items in her cabinet. Short term goal 4: Patient will report a decrease in R shoulder pain from 8/10 to 4/10. Short term goal 5: Patient will present with an improved score on the QuickDASH from 43.2% impaired to 33% impaired. Summary of Evaluation: Assessment: Jeni Atwood is a 45year old female who presents to physical therapy with adhesive capsulitis of the right shoulder. Objective findings include decreased R shoulder AROM and PROM, decreased R shoulder strength, and postural impairments. Laura Mcpherson will benefit from skilled therapeutic intervention in this setting to decrease pain and improve overall function of the right shoulder.     Any changes in Ambulatory Summary Sheet? None    Subjective: Pt arrives to tx session reporting 0/10 pain does report slight irritation from swimming this weekend. Objective:      COVID screening questions were asked and patient attested that there had been no contact or symptoms  The patient received  explanation for the benefits and rational in utilizing the Graston Technique for their soft tissues limitations. Patient does not have any Red flags or absolute contraindications. Patient was instructed the use of the Graston Instruments on the skin may cause some discomfort/pain [place] of the skin, bruising or Petechiae. Patient understands these risks and has agreed to continue with the intervention. PROM  flex   130°   PROM abduction 121°  PROM ER 50°      Exercises: (No more than 4 columns)   Exercise/Equipment 6/29/2021 #9 7/2/2021 #10 7/6/21 #11           WARM UP                     TABLE      Cane press ups 10x AROM 10x AROM 10x AROM   Supine shoulder ER AAROM w/cane man S/L 10x2  S/L 10x2    Supine shoulder flexion AAROM w/cane 10x2 5\"   10x AROM AROM 10x  AROM 10x   Seated shoulder abduction AAROM w/cane      Concentric circles CW/CCW   10x2 ea dir 10x2 ea dir   Shoulder internal rotation behind back AAROM w/cane      Shoulder flexion table walk back      Pulleys -- Flexion and scap   2x10 5\" ea  Flexion and scap   2x10 5\" ea    Seated chin tucks 10x2 5\"      Seated shoulder ER AAROM w/cane      Manual therapy See below See below See below   STANDING      SB roll on table       Shoulder abduction w/ cane                                         PROPRIOCEPTION                                    MODALITIES      Vaso 10'  10'  Pt deferred    HP to neck  10'          Other Therapeutic Activities/Education:  Education on anatomy of the shoulder joint and etiology of adhesive capsulitis. Discussed findings from initial evaluation.  Discussed pain management techniques using heat to loosen the shoulder prior to stretches. Discussed buying pulley's for home use. Home Exercise Program:  Created and reviewed, exercises listed above      Manual Treatments:    PROM/MOBS to the right shoulder, scapular mobs with patient in side-lying and STM to neck        Modalities: Pt deferred       Communication with other providers:  POC sent for cosign on 5/19/21      Assessment:   Pt tolerated treatment fair today. Pt was able to get more pain-free motion AROM  after manual treatment. Pt rated pain at 0/10 in neck and shoulder. Pt will benefit from more ROM and strengthening as able. Assessment: Ramesh Tang is a 45year old female who presents to physical therapy with adhesive capsulitis of the right shoulder. Objective findings include decreased R shoulder AROM and PROM, decreased R shoulder strength, and postural impairments. Ashley Zepeda will benefit from skilled therapeutic intervention in this setting to decrease pain and improve overall function of the right shoulder.       Plan for Next Session:   Continue to progress towards goals set at IE.  DNT????      Time In / Time Out:  1116 / 1154     If [facility] Please Indicate Time In/Out/Total Time  CPT Code Time in Time out Total Time                                                            Total for session             Timed Code/Total Treatment Minutes:   45' / 45'    1 TE     2 man       Next Progress Note due:  10th visit      Plan of Care Interventions:  [x] Therapeutic Exercise  [x] Modalities:  [x] Therapeutic Activity     [] Ultrasound  [] Estim  [] Gait Training      [] Cervical Traction [] Lumbar Traction  [x] Neuromuscular Re-education    [x] Cold/hotpack [] Iontophoresis   [x] Instruction in HEP      [x] Vasopneumatic   [] Dry Needling    [x] Manual Therapy               [] Aquatic Therapy              Electronically signed by:  Niharika Leigh PTA,    7/6/2021, 10:07 AM      7/6/2021,10:07 AM

## 2021-07-08 ENCOUNTER — HOSPITAL ENCOUNTER (OUTPATIENT)
Dept: PHYSICAL THERAPY | Age: 51
Setting detail: THERAPIES SERIES
Discharge: HOME | End: 2021-07-08
Payer: COMMERCIAL

## 2021-07-08 PROCEDURE — 97140 MANUAL THERAPY 1/> REGIONS: CPT

## 2021-07-08 PROCEDURE — 97110 THERAPEUTIC EXERCISES: CPT

## 2021-07-08 NOTE — FLOWSHEET NOTE
Outpatient Physical Therapy  [place]           [x] Phone: [phone]   Fax: [phone]  [place]           [] Phone: [phone]   Fax: [phone]        Physical Therapy Daily Treatment Note  Date:  2021    Patient Name:  Ramon Penn    :  1970  MRN: [id number]  Restrictions/Precautions:    Diagnosis:   Diagnosis: Adhesive Capsulitis of right shoulder  Date of Injury/Surgery:   Treatment Diagnosis: Treatment Diagnosis: Adhesive Capsulitis of the R shoulder, R shoulder pain    Insurance/Certification information: PT Insurance Information: [employer]- Precert required after 12 visits. 20 visits PCY total   Referring Physician:  Referring Practitioner: Dr. Jackeline Bobby  Next Doctor Visit:     Plan of care signed (Y/N): Y (20 pcy, 89DN visist pre-cert required)  Outcome Measure: Quick DASH: 6.8%  Visit# / total visits: 12/12  Pain level: 0/10 \"slight irritation\"  Goals:     Patient goals : Decrease R shoulder pain, improve ability to reach  Short term goals  Time Frame for Short term goals: 5 weeks  Short term goal 1: Patient will report compliance with HEP. MET-reports compliance  Short term goal 2: Patient will present with improved R shoulder PROM by 10 degrees in all ranges. MET except ER  Short term goal 3: Patient will present with improved R shoulder flexion from 80 degrees AROM to 110 degrees AROM so that she can reach for items in her cabinet. Good improvements  Short term goal 4: Patient will report a decrease in R shoulder pain from 8/10 to 4/10. MET  Short term goal 5: Patient will present with an improved score on the QuickDASH from 43.2% impaired to 33% impaired.    MET  New Goals:  Pt demonstrates shoulder flexion AROM to 130 deg so that she can reach for items in her cabinet  Pt demonstrates shoulder abduction AROM to 110 deg to improve ability to reach overhead      Summary of Evaluation: Assessment: Mayank Sandoval is a 45year old female who presents to physical therapy with adhesive capsulitis of the right shoulder. Objective findings include decreased R shoulder AROM and PROM, decreased R shoulder strength, and postural impairments. Kiet Brink will benefit from skilled therapeutic intervention in this setting to decrease pain and improve overall function of the right shoulder. Any changes in Ambulatory Summary Sheet? None    Subjective: Kiet Brink reports consistency with swimming and HEP program. She states her pain is very little now, but will get some \"irritation\" after swimming the following couple of days. She returns to Dr. Cyndee Mora on August 10th for a follow-up, but is currently trying to assist her in getting more PT visits. Feels she has made some improvements in her motion since the start of therapy, but really wants to avoid getting surgery. Objective:      COVID screening questions were asked and patient attested that there had been no contact or symptoms  The patient received  explanation for the benefits and rational in utilizing the Graston Technique for their soft tissues limitations. Patient does not have any Red flags or absolute contraindications. Patient was instructed the use of the Graston Instruments on the skin may cause some discomfort/pain [place] of the skin, bruising or Petechiae. Patient understands these risks and has agreed to continue with the intervention.       PROM RUE:  R Shoulder Flex: 130 deg (previous 98 deg)  R Shoulder ABduction: 110 deg (previous 70 deg)  R Shoulder Ext Rotation:  50 deg (previous 50 deg)     AROM RUE:  R Shoulder Flexion: 107 deg (previous 80 deg)  R Shoulder Abduction: 90 deg (previous 68 deg)  R Shoulder Ext Rotation: 35 deg   Functional ER: slightly R to C6-C7 (previous Level of R ear)  Functional IR:  Slightly R from L5 (previous Level of R PSIS)     Quick DASH: 6.8%      Exercises: (No more than 4 columns)   Exercise/Equipment 6/29/2021 #9 7/2/2021 #10 7/6/21 #11 7/8/21 #12            WARM UP TABLE       Cane press ups 10x AROM 10x AROM 10x AROM    Supine shoulder ER AAROM w/cane man S/L 10x2  S/L 10x2     Supine shoulder flexion AAROM w/cane 10x2 5\"   10x AROM AROM 10x  AROM 10x    Seated shoulder abduction AAROM w/cane       Concentric circles CW/CCW   10x2 ea dir 10x2 ea dir    Shoulder internal rotation behind back AAROM w/cane       Shoulder flexion table walk back       Pulleys -- Flexion and scap   2x10 5\" ea  Flexion and scap   2x10 5\" ea  Flexion and scap 2x10 5\" ea   Seated chin tucks 10x2 5\"       Seated shoulder ER AAROM w/cane       Manual therapy See below See below See below See below   IR Towel Stretch    x10 5\"                 STANDING       SB roll on table        Shoulder abduction w/ cane                                               PROPRIOCEPTION                                          MODALITIES       Vaso 10'  10'  Pt deferred     HP to neck  10'           Other Therapeutic Activities/Education:  Education on anatomy of the shoulder joint and etiology of adhesive capsulitis. Discussed findings from initial evaluation. Discussed pain management techniques using heat to loosen the shoulder prior to stretches. Discussed buying pulley's for home use. Home Exercise Program:  Created and reviewed, exercises listed above      Manual Treatments:    PROM/MOBS to the right shoulder, scapular mobs with patient in side-lying x25        Modalities: Pt deferred       Communication with other providers:  POC sent for cosign on 5/19/21      Assessment:   Brendon Schroeder has completed 12 visits since the start of therapy on 5/19/21. Pt demonstrates some improvement in her mobility since the start of therapy, but continues to have the biggest challenge with ER. Continues to display significant hypomobility in all directions of her [address] joint, inability to reach behind her waist and head, and complete tasks overhead.  Pt continues have major deficits in her RUE AROM/PROM even with her improvements since the start of therapy. This patient would continue to benefit from ongoing therapy to continue with manual stretching/joint mobilizations as well as trialing other techniques such as DNT. Patient is limited to 20 visits per year and is still waiting on approval/consideration of extension from her insurance. In this case, we will spread her visits out to x1 per week for every other week to ensure patient continues to receive follow-up over an extended period time due to the diagnosis status/prognosis. Assessment: Arelis Sagastume is a 45year old female who presents to physical therapy with adhesive capsulitis of the right shoulder. Objective findings include decreased R shoulder AROM and PROM, decreased R shoulder strength, and postural impairments. Salina Germain will benefit from skilled therapeutic intervention in this setting to decrease pain and improve overall function of the right shoulder.       Plan for Next Session:   Continue to progress towards goals set at IE.  DNT????      Time In / Time Out:  5522-6619     If [facility] Please Indicate Time In/Out/Total Time  CPT Code Time in Time out Total Time                                                            Total for session             Timed Code/Total Treatment Minutes:   39' / 39'    (1) TE 20     (2) man  22'     Next Progress Note due:  10th visit      Plan of Care Interventions:  [x] Therapeutic Exercise  [x] Modalities:  [x] Therapeutic Activity     [] Ultrasound  [] Estim  [] Gait Training      [] Cervical Traction [] Lumbar Traction  [x] Neuromuscular Re-education    [x] Cold/hotpack [] Iontophoresis   [x] Instruction in HEP      [x] Vasopneumatic   [] Dry Needling    [x] Manual Therapy               [] Aquatic Therapy              Electronically signed by:  Denita Guardado PT, DPT, CSCS   7/8/2021, 6:49 AM      7/8/2021,6:49 AM

## 2021-07-08 NOTE — PROGRESS NOTES
Outpatient Physical Therapy           [place]           [] Phone: [phone]   Fax: [phone]  [facility]           [] Phone: [phone]   Fax: [phone]      To: Dr. Yg Hale                                        From: Niecy Felipe, PT, PT     Patient: Chuck Shaikh                  : 1970  Diagnosis:  R Adhesive Capsulitis    Date: 2021  Treatment Diagnosis: Adhesive Capsulitis of the R shoulder, R shoulder pain         [x]  Progress Note                []  Discharge Note    Evaluation Date:  21   Total Visits to date:  12  Cancels/No-shows to date:  2    Subjective: Chris Ritter reports consistency with swimming and HEP program. She states her pain is very little now, but will get some \"irritation\" after swimming the following couple of days. She returns to Dr. Yg Hale on  for a follow-up, but is currently trying to assist her in getting more PT visits. Feels she has made some improvements in her motion since the start of therapy, but really wants to avoid getting surgery.       Plan of Care/Treatment to date:  [x] Therapeutic Exercise    [x] Modalities:  [x] Therapeutic Activity     [] Ultrasound  [] Electrical Stimulation  [] Gait Training      [] Cervical Traction   [] Lumbar Traction  [x] Neuromuscular Re-education  [] Cold/hotpack [] Iontophoresis  [x] Instruction in HEP      Other:  [x] Manual Therapy       [x]  Vasopneumatic  [] Aquatic Therapy       [x]   Dry Needle Therapy                      Objective/Significant Findings At Last Visit/Comments:    PROM RUE:  R Shoulder Flex: 130 deg (previous 98 deg)  R Shoulder ABduction: 110 deg (previous 70 deg)  R Shoulder Ext Rotation:  50 deg (previous 50 deg)    AROM RUE:  R Shoulder Flexion: 107 deg (previous 80 deg)  R Shoulder Abduction: 90 deg (previous 68 deg)  R Shoulder Ext Rotation: 35 deg   Functional ER: slightly R to C6-C7 (previous Level of R ear)  Functional IR:  Slightly R from L5 (previous Level of R PSIS)    Quick DASH: 6.8%    Assessment:   Miguel Rodriguez has completed 12 visits since the start of therapy on 5/19/21. Pt demonstrates some improvement in her mobility since the start of therapy, but continues to have the biggest challenge with ER. Continues to display significant hypomobility in all directions of her [address] joint, inability to reach behind her waist and head, and complete tasks overhead. Pt continues have major deficits in her RUE AROM/PROM even with her improvements since the start of therapy. This patient would continue to benefit from ongoing therapy to continue with manual stretching/joint mobilizations as well as trialing other techniques such as DNT. Patient is limited to 20 visits per year and is still waiting on approval/consideration of extension from her insurance. In this case, we will spread her visits out to x1 per week for every other week to ensure patient continues to receive follow-up over an extended period time due to the diagnosis status/prognosis. Goal Status:  [] Achieved [x] Partially Achieved  [] Not Achieved     Changes to goals:    Patient goals : Decrease R shoulder pain, improve ability to reach  Short term goals  Time Frame for Short term goals: 5 weeks  Short term goal 1: Patient will report compliance with HEP. MET-reports compliance  Short term goal 2: Patient will present with improved R shoulder PROM by 10 degrees in all ranges. MET except ER  Short term goal 3: Patient will present with improved R shoulder flexion from 80 degrees AROM to 110 degrees AROM so that she can reach for items in her cabinet. Good improvements  Short term goal 4: Patient will report a decrease in R shoulder pain from 8/10 to 4/10. MET  Short term goal 5: Patient will present with an improved score on the QuickDASH from 43.2% impaired to 33% impaired.  MET  New Goals:  Pt demonstrates shoulder flexion AROM to 130 deg so that she can reach for items in her cabinet  Pt demonstrates shoulder abduction AROM to 110 deg to improve ability to reach overhead      Frequency/Duration:  # Days per week: [x] 1 day # Weeks: [] 1 week [] 4 weeks [x] 8 weeks     [] 2 days   [] 2 weeks [] 5 weeks [] 10 weeks     [] 3 days   [] 3 weeks [] 6 weeks [] 12 weeks                                           Every other week with approval from pre-cert    Rehab Potential: [] Excellent [x] Good [x] Fair  [] Poor         Patient Status: [x] Continue per initial plan of Care     [] Patient now discharged     [] Additional visits requested, Please re-certify for additional visits:      Requested frequency/duration:  1/week for  6-8 weeks    If we are requesting more visits, we fully anticipate the patient's condition is expected to improve within the treatment timeframe we are requesting. Electronically signed by:  Alexander Collins PT, STAST, [facility] 7/8/2021, 6:50 AM    If you have any questions or concerns, please don't hesitate to call.   Thank you for your referral.    Physician Signature:______________________ Date:______ Time: ________  By signing above, therapists plan is approved by physician

## 2021-07-14 NOTE — PRE-CERTIFICATION NOTE
Pt approved for a total of 36 visits,  From 5/19/2021-10/8/2021. DNT is not covered.   Auth# [id number]

## 2021-07-15 ENCOUNTER — HOSPITAL ENCOUNTER (OUTPATIENT)
Dept: PHYSICAL THERAPY | Age: 51
Setting detail: THERAPIES SERIES
Discharge: HOME | End: 2021-07-15
Payer: COMMERCIAL

## 2021-07-15 PROCEDURE — 97110 THERAPEUTIC EXERCISES: CPT

## 2021-07-15 PROCEDURE — 97140 MANUAL THERAPY 1/> REGIONS: CPT

## 2021-07-15 NOTE — FLOWSHEET NOTE
Outpatient Physical Therapy  [place]           [x] Phone: [phone]   Fax: [phone]  [facility]           [] Phone: [phone]   Fax: [phone]        Physical Therapy Daily Treatment Note  Date:  7/15/2021    Patient Name:  Gisela Curiel    :  1970  MRN: [id number]  Restrictions/Precautions:    Diagnosis:   Diagnosis: Adhesive Capsulitis of right shoulder  Date of Injury/Surgery:   Treatment Diagnosis: Treatment Diagnosis: Adhesive Capsulitis of the R shoulder, R shoulder pain    Insurance/Certification information: PT Insurance Information: [employer]- Precert required after 12 visits. 20 visits PCY total   Referring Physician:  Referring Practitioner: Dr. José Maya  Next Doctor Visit:     Plan of care signed (Y/N): Y approved for a total of 36 visits,  From 2021-10/8/2021. DNT is not covered  Outcome Measure: Quick DASH: 6.8%  Visit# / total visits:   Pain level: 0/10   Goals:     Patient goals : Decrease R shoulder pain, improve ability to reach  Short term goals  Time Frame for Short term goals: 5 weeks  Short term goal 1: Patient will report compliance with HEP. MET-reports compliance  Short term goal 2: Patient will present with improved R shoulder PROM by 10 degrees in all ranges. MET except ER  Short term goal 3: Patient will present with improved R shoulder flexion from 80 degrees AROM to 110 degrees AROM so that she can reach for items in her cabinet. Good improvements  Short term goal 4: Patient will report a decrease in R shoulder pain from 8/10 to 4/10. MET  Short term goal 5: Patient will present with an improved score on the QuickDASH from 43.2% impaired to 33% impaired.    MET  New Goals:  Pt demonstrates shoulder flexion AROM to 130 deg so that she can reach for items in her cabinet  Pt demonstrates shoulder abduction AROM to 110 deg to improve ability to reach overhead      Summary of Evaluation: Assessment: Maral Messina is a 45year old female who presents to physical therapy with adhesive capsulitis of the right shoulder. Objective findings include decreased R shoulder AROM and PROM, decreased R shoulder strength, and postural impairments. Brendon Bangura will benefit from skilled therapeutic intervention in this setting to decrease pain and improve overall function of the right shoulder. Any changes in Ambulatory Summary Sheet? None    Subjective: Brendon Bangura reports 0/10 pain and no irritation upon arrival. She was sore for about a day after the last therapy session.  Has been unable to get into the pool due to the weather, but continues to try and use her RUE for all activities       Objective:  COVID screening questions were asked and patient attested that there had been no contact or symptoms        PROM RUE:  R Shoulder Flex: 130 deg  R Shoulder ABduction: 110 deg  R Shoulder Ext Rotation:  50 deg      AROM RUE:  R Shoulder Flexion: 112 deg  R Shoulder Abduction: 97 deg         Exercises: (No more than 4 columns)   Exercise/Equipment 7/2/2021 #10 7/6/21 #11 7/8/21 #12 7/15/21 #13            WARM UP                        TABLE       Cane press ups 10x AROM 10x AROM     Supine shoulder ER AAROM w/cane S/L 10x2  S/L 10x2   x10 seated    Supine shoulder flexion AAROM w/cane AROM 10x  AROM 10x  x15     x10 with #3   Seated shoulder abduction AAROM w/cane    x10 seated    Concentric circles CW/CCW  10x2 ea dir 10x2 ea dir     Shoulder internal rotation behind back AAROM w/cane       Shoulder flexion table walk back       Pulleys Flexion and scap   2x10 5\" ea  Flexion and scap   2x10 5\" ea  Flexion and scap 2x10 5\" ea Flexion and scaption 2x10 5\" ea   Seated chin tucks       Seated shoulder ER AAROM w/cane       Manual therapy See below See below See below See below    IR Towel Stretch   x10 5\" x10 5\"                 STANDING       SB roll on table        Shoulder abduction w/ cane                                               PROPRIOCEPTION MODALITIES       Vaso 10'  Pt deferred      HP to neck            Other Therapeutic Activities/Education:  Education on anatomy of the shoulder joint and etiology of adhesive capsulitis. Discussed findings from initial evaluation. Discussed pain management techniques using heat to loosen the shoulder prior to stretches. Discussed buying pulley's for home use. Home Exercise Program:  Created and reviewed, exercises listed above      Manual Treatments:    PROM/MOBS to the right shoulder, scapular mobs with patient in side-lying x25        Modalities:       Communication with other providers:  POC sent for cosign on 5/19/21      Assessment:   Meeta Hackett demonstrated good tolerance to today's session. She was able to achieve improved AROM in should flexion and abduction, but continues to stay consistent with PROM. Had discussion with patient regarding DNT not being covered and pt expressed she would be open to paying out of pocket. Will continue with manual/soft tissue work for the next couple weeks due to slow improvements with active motion. End of session: 0/10 slight soreness       Assessment: Dallas Dela Cruz is a 45year old female who presents to physical therapy with adhesive capsulitis of the right shoulder. Objective findings include decreased R shoulder AROM and PROM, decreased R shoulder strength, and postural impairments. Meeta Hackett will benefit from skilled therapeutic intervention in this setting to decrease pain and improve overall function of the right shoulder.       Plan for Next Session:         Time In / Time Out:  4411-7741     If [facility] Please Indicate Time In/Out/Total Time  CPT Code Time in Time out Total Time                                                            Total for session             Timed Code/Total Treatment Minutes:   39' / 39'    (1) TE 20     (2) man  22'     Next Progress Note due:  10th visit      Plan of Care Interventions:  [x] Therapeutic Exercise  [x]

## 2021-07-27 ENCOUNTER — HOSPITAL ENCOUNTER (OUTPATIENT)
Dept: PHYSICAL THERAPY | Age: 51
Setting detail: THERAPIES SERIES
Discharge: HOME | End: 2021-07-27
Payer: COMMERCIAL

## 2021-07-27 PROCEDURE — 97110 THERAPEUTIC EXERCISES: CPT

## 2021-07-27 PROCEDURE — 97140 MANUAL THERAPY 1/> REGIONS: CPT

## 2021-07-27 NOTE — FLOWSHEET NOTE
Outpatient Physical Therapy  [place]           [x] Phone: [phone]   Fax: [phone]  [facility]           [] Phone: [phone]   Fax: [phone]        Physical Therapy Daily Treatment Note  Date:  2021    Patient Name:  Olga Hull    :  1970  MRN: [id number]  Restrictions/Precautions:    Diagnosis:   Diagnosis: Adhesive Capsulitis of right shoulder  Date of Injury/Surgery:   Treatment Diagnosis: Treatment Diagnosis: Adhesive Capsulitis of the R shoulder, R shoulder pain    Insurance/Certification information: PT Insurance Information: [employer]- Precert required after 12 visits. 20 visits PCY total   Referring Physician:  Referring Practitioner: Dr. Swapnil Haddad  Next Doctor Visit:     Plan of care signed (Y/N): Y approved for a total of 36 visits,  From 2021-10/8/2021. DNT is not covered  Outcome Measure: Quick DASH: 6.8%  Visit# / total visits:   Pain level: 0/10   Goals:     Patient goals : Decrease R shoulder pain, improve ability to reach  Short term goals  Time Frame for Short term goals: 5 weeks  Short term goal 1: Patient will report compliance with HEP. MET-reports compliance  Short term goal 2: Patient will present with improved R shoulder PROM by 10 degrees in all ranges. MET except ER  Short term goal 3: Patient will present with improved R shoulder flexion from 80 degrees AROM to 110 degrees AROM so that she can reach for items in her cabinet. Good improvements  Short term goal 4: Patient will report a decrease in R shoulder pain from 8/10 to 4/10. MET  Short term goal 5: Patient will present with an improved score on the QuickDASH from 43.2% impaired to 33% impaired.    MET  New Goals:  Pt demonstrates shoulder flexion AROM to 130 deg so that she can reach for items in her cabinet  Pt demonstrates shoulder abduction AROM to 110 deg to improve ability to reach overhead      Summary of Evaluation: Assessment: Aziza Leigh is a 45year old female who presents to physical therapy with adhesive capsulitis of the right shoulder. Objective findings include decreased R shoulder AROM and PROM, decreased R shoulder strength, and postural impairments. Meeta Hackett will benefit from skilled therapeutic intervention in this setting to decrease pain and improve overall function of the right shoulder. Any changes in Ambulatory Summary Sheet? None    Subjective: Pt stated that she wasn't having any pain today. Pt stated that she fears she has lost ROM. Pt stated that she worked on swimming in her pool, but could tell that her ROM was decreasing.      Objective:  COVID screening questions were asked and patient attested that there had been no contact or symptoms       Prior to manual  PROM  Flexion 120°  abduction 110°   after manual   PROM RUE:  R Shoulder Flex: 128°  R Shoulder ABduction: 117°  R Shoulder Ext Rotation:  45°     AROM RUE:  R Shoulder Flexion: 112°  R Shoulder Abduction: 97°        Exercises: (No more than 4 columns)   Exercise/Equipment 7/2/2021 #10 7/6/21 #11 7/8/21 #12 7/15/21 #13 7/27/2021 #14             WARM UP                           TABLE        Cane press ups 10x AROM 10x AROM   Cane w/ 3# 10x2   Supine shoulder ER AAROM w/cane S/L 10x2  S/L 10x2   x10 seated  15x    Supine shoulder flexion AAROM w/cane AROM 10x  AROM 10x  x15     x10 with #3 Cane w/ 3#   10x2   Seated shoulder abduction AAROM w/cane    x10 seated  10x2   Concentric circles CW/CCW  10x2 ea dir 10x2 ea dir      Shoulder internal rotation behind back AAROM w/cane        Shoulder flexion table walk back        Pulleys Flexion and scap   2x10 5\" ea  Flexion and scap   2x10 5\" ea  Flexion and scap 2x10 5\" ea Flexion and scaption 2x10 5\" ea Flexion and scaption 2x10 5\" ea   Seated chin tucks        Seated shoulder ER AAROM w/cane        Manual therapy See below See below See below See below  See below   IR Towel Stretch   x10 5\" x10 5\"                    STANDING        SB roll on table Shoulder abduction w/ cane                                                     PROPRIOCEPTION                                                MODALITIES        Vaso 10'  Pt deferred    deferred   HP to neck             Other Therapeutic Activities/Education:  Education on anatomy of the shoulder joint and etiology of adhesive capsulitis. Discussed findings from initial evaluation. Discussed pain management techniques using heat to loosen the shoulder prior to stretches. Discussed buying pulley's for home use. Home Exercise Program:  Created and reviewed, exercises listed above      Manual Treatments:  PROM/MOBS to the right shoulder, scapular mobs with patient in side-lying x25'       Modalities:       Communication with other providers:  POC sent for cosign on 5/19/21      Assessment:   Pt tolerated treatment fairly well. Pt was able to get more PROM after manual treatment. Pt rated pain at 0/10 after treatment. Pt will continue to benefit from more ROM and strengthening. Assessment: Mayte Hyman is a 45year old female who presents to physical therapy with adhesive capsulitis of the right shoulder. Objective findings include decreased R shoulder AROM and PROM, decreased R shoulder strength, and postural impairments. Christianne Powell will benefit from skilled therapeutic intervention in this setting to decrease pain and improve overall function of the right shoulder.       Plan for Next Session:         Time In / Time Out:  1755/1838   43'/43' 2 man ( 25') 1 TE (18')      If [facility] Please Indicate Time In/Out/Total Time  CPT Code Time in Time out Total Time                                                            Total for session             Timed Code/Total Treatment Minutes:   39' / 39'    (1) TE 20     (2) man  22'     Next Progress Note due:  10th visit      Plan of Care Interventions:  [x] Therapeutic Exercise  [x] Modalities:  [x] Therapeutic Activity     [] Ultrasound  [] Estim  [] Gait Training [] Cervical Traction [] Lumbar Traction  [x] Neuromuscular Re-education    [x] Cold/hotpack [] Iontophoresis   [x] Instruction in HEP      [x] Vasopneumatic   [] Dry Needling    [x] Manual Therapy               [] Aquatic Therapy              Electronically signed by:  Neno Bullock   7/27/2021, 5:56 PM         7/27/2021,6:51 PM

## 2021-08-03 ENCOUNTER — HOSPITAL ENCOUNTER (OUTPATIENT)
Dept: PHYSICAL THERAPY | Age: 51
Setting detail: THERAPIES SERIES
Discharge: HOME | End: 2021-08-03
Payer: COMMERCIAL

## 2021-08-03 PROCEDURE — 97016 VASOPNEUMATIC DEVICE THERAPY: CPT

## 2021-08-03 PROCEDURE — 97140 MANUAL THERAPY 1/> REGIONS: CPT

## 2021-08-03 PROCEDURE — 97110 THERAPEUTIC EXERCISES: CPT

## 2021-08-03 NOTE — FLOWSHEET NOTE
Outpatient Physical Therapy  [place]           [x] Phone: [phone]   Fax: [phone]  [place]           [] Phone: [phone]   Fax: [phone]        Physical Therapy Daily Treatment Note  Date:  8/3/2021    Patient Name:  Jana Yusuf    :  1970  MRN: [id number]  Restrictions/Precautions:    Diagnosis:   Diagnosis: Adhesive Capsulitis of right shoulder  Date of Injury/Surgery:   Treatment Diagnosis: Treatment Diagnosis: Adhesive Capsulitis of the R shoulder, R shoulder pain    Insurance/Certification information: PT Insurance Information: [employer]- Precert required after 12 visits. 20 visits PCY total   Referring Physician:  Referring Practitioner: Dr. Steph Sorenson  Next Doctor Visit:     Plan of care signed (Y/N): Y approved for a total of 36 visits per year,  From 2021-10/8/2021. DNT is not covered  Outcome Measure: Quick DASH: 6.8%  Visit# / total visits: 15  Pain level: 0/10   Goals:     Patient goals : Decrease R shoulder pain, improve ability to reach  Short term goals  Time Frame for Short term goals: 5 weeks  Short term goal 1: Patient will report compliance with HEP. MET-reports compliance  Short term goal 2: Patient will present with improved R shoulder PROM by 10 degrees in all ranges. MET except ER  Short term goal 3: Patient will present with improved R shoulder flexion from 80 degrees AROM to 110 degrees AROM so that she can reach for items in her cabinet. Good improvements  Short term goal 4: Patient will report a decrease in R shoulder pain from 8/10 to 4/10. MET  Short term goal 5: Patient will present with an improved score on the QuickDASH from 43.2% impaired to 33% impaired.    MET  New Goals:  Pt demonstrates shoulder flexion AROM to 130 deg so that she can reach for items in her cabinet  Pt demonstrates shoulder abduction AROM to 110 deg to improve ability to reach overhead      Summary of Evaluation: Assessment: Tae Ricci is a 45year old female who presents to physical therapy with adhesive capsulitis of the right shoulder. Objective findings include decreased R shoulder AROM and PROM, decreased R shoulder strength, and postural impairments. Sariah Bernal will benefit from skilled therapeutic intervention in this setting to decrease pain and improve overall function of the right shoulder. Any changes in Ambulatory Summary Sheet? None    Subjective:  Pt stated that she is not having any pain just stiffness. Pt stated that she was able to reach up and put 6-7 out of 12 shower hooks into her new curtain, but stated that afterwards her pain increased.       Objective:  COVID screening questions were asked and patient attested that there had been no contact or symptoms          supine  AROM RUE:  R Shoulder Flexion: 120°  R Shoulder Abduction: 106°      PROM R UE  R shoulder flexion 132°  R shoulder abd/ scap 135°  R Shoulder ER 55°      Exercises: (No more than 4 columns)   Exercise/Equipment 7/8/21 #12 7/15/21 #13 7/27/2021 #14 8/3/2021 #15            WARM UP                        TABLE       Cane press ups   Lakisha Malik w/ 3# 10x2 Cane w/ 3# 10x2  2# DB 15x   Supine shoulder ER AAROM w/cane  x10 seated  15x  S/L 10x2   Supine shoulder flexion AAROM w/cane  x15     x10 with #3 Cane w/ 3#   10x2 Cane w/ 3#   10x2  AROM 10x   Seated shoulder abduction AAROM w/cane  x10 seated  10x2 10x2   Concentric circles CW/CCW     2# 10x2 ea dir   Shoulder internal rotation behind back AAROM w/cane       Shoulder flexion table walk back       Pulleys Flexion and scap 2x10 5\" ea Flexion and scaption 2x10 5\" ea Flexion and scaption 2x10 5\" ea Flexion and scaption x 15 ea  5\" hold   Seated chin tucks       Seated shoulder ER AAROM w/cane       Manual therapy See below See below  See below See below   IR Towel Stretch x10 5\" x10 5\"                   STANDING       SB roll on table        Shoulder abduction w/ cane                                               PROPRIOCEPTION Vasopneumatic   [] Dry Needling    [x] Manual Therapy               [] Aquatic Therapy              Electronically signed by:  Svetlana Cat   8/3/2021, 2:27 PM          8/3/2021,7:39 PM

## 2021-08-10 ENCOUNTER — OFFICE VISIT (OUTPATIENT)
Dept: ORTHOPEDIC SURGERY | Age: 51
End: 2021-08-10
Payer: COMMERCIAL

## 2021-08-10 VITALS
BODY MASS INDEX: 31.39 KG/M2 | WEIGHT: 200 LBS | HEIGHT: 67 IN | OXYGEN SATURATION: 99 % | RESPIRATION RATE: 16 BRPM | HEART RATE: 66 BPM

## 2021-08-10 DIAGNOSIS — M75.01 ADHESIVE CAPSULITIS OF RIGHT SHOULDER: Primary | ICD-10-CM

## 2021-08-10 PROCEDURE — 99213 OFFICE O/P EST LOW 20 MIN: CPT | Performed by: ORTHOPAEDIC SURGERY

## 2021-08-10 NOTE — PATIENT INSTRUCTIONS
Continue to work on ROM and stretching exercises per therapy protocol  May take Ibuprofen or Motrin as needed  Rest, ice, and elevate as needed  Weightbearing and activities as tolerated  Follow up as needed

## 2021-08-10 NOTE — PROGRESS NOTES
8/10/2021   Chief Complaint   Patient presents with    Shoulder Pain     right        History of Present Illness: Iwona Quinones is a 45 y.o. female returns today for follow-up of her right shoulder. She has been making progress with physical therapy but this is coming along slowly. She has been able to resume good activities and use of the arm but still is restricted with her ability to fully rotate or elevate the arm overhead. Patient returns to the office for a follow up on her right shoulder pain following onset of therapy which is going well with patient slowly progressing with improved range of motion and strength. No pain is reported in office today and patient expresses frustration at this time because she feels as if she should be further along and feels as if she has reached a plateau. No new injury or worsening of symptoms reported. Medical History  Patient's medications, allergies, past medical, surgical, social and family histories were reviewed and updated as appropriate. Review of Systems   Constitutional: Negative for activity change and fever. Respiratory: Negative for chest tightness and shortness of breath. Cardiovascular: Negative for chest pain. Skin: Negative for color change. Neurological: Negative for dizziness. Psychiatric/Behavioral: The patient is not nervous/anxious. Examination:  General Exam:  Vitals: Pulse 66   Resp 16   Ht 5' 7\" (1.702 m)   Wt 200 lb (90.7 kg)   SpO2 99%   BMI 31.32 kg/m²    Physical Exam     Right upper extremity:  Improved but persistent mild tenderness to palpation diffusely throughout the shoulder joint. There is good active range of motion and rotator cuff strength present with forward elevation and abduction to just above 90 degrees.   She is able to internally and externally rotate but has difficult time getting past the hip joint during internal rotation. Sensation and motor function is intact throughout the upper extremity. Passively I am able to abduct to 100 degrees and forward elevate to 120 degrees    Office Procedures:  No orders of the defined types were placed in this encounter. Assessment and Plan     1. Right shoulder adhesive capsulitis    I have reassured the patient that the shoulder is improving with time and she is showing good progress with therapy. I expect the range of motion and function in her shoulder to fully resolve with continued practice and home exercise program.  We discussed the length of recovery which may continue to extend for several more months. Continue physical therapy and aggressive stretching exercises at home program.    We discussed potential use of steroid injections only on a limited basis if she is having severe pain or inflammation or has worsening stiffness issues in the future. We have agreed that she does not require an injection at this time.     Follow-up as needed      Electronically signed by Khalif Gonzalez MD on 8/10/2021 at 11:15 AM

## 2021-08-10 NOTE — PROGRESS NOTES
Patient returns to the office for a follow up on her right shoulder pain following onset of therapy which is going well with patient slowly progressing with improved range of motion and strength. No pain is reported in office today and patient expresses frustration at this time because she feels as if she should be further along and feels as if she has reached a plateau. No new injury or worsening of symptoms reported.

## 2021-08-11 ASSESSMENT — ENCOUNTER SYMPTOMS
SHORTNESS OF BREATH: 0
CHEST TIGHTNESS: 0
COLOR CHANGE: 0

## 2021-09-08 ENCOUNTER — APPOINTMENT (OUTPATIENT)
Dept: GENERAL RADIOLOGY | Age: 51
End: 2021-09-08
Payer: COMMERCIAL

## 2021-09-08 ENCOUNTER — HOSPITAL ENCOUNTER (EMERGENCY)
Age: 51
Discharge: HOME | End: 2021-09-08
Payer: COMMERCIAL

## 2021-09-08 VITALS
DIASTOLIC BLOOD PRESSURE: 59 MMHG | HEIGHT: 67 IN | SYSTOLIC BLOOD PRESSURE: 112 MMHG | RESPIRATION RATE: 18 BRPM | BODY MASS INDEX: 31.39 KG/M2 | HEART RATE: 75 BPM | TEMPERATURE: 98.3 F | OXYGEN SATURATION: 100 % | WEIGHT: 200 LBS

## 2021-09-08 DIAGNOSIS — R09.1 PLEURISY: Primary | ICD-10-CM

## 2021-09-08 DIAGNOSIS — R07.89 CHEST WALL PAIN: ICD-10-CM

## 2021-09-08 LAB
ALBUMIN SERPL-MCNC: 4.3 GM/DL (ref 3.4–5)
ALP BLD-CCNC: 128 IU/L (ref 40–129)
ALT SERPL-CCNC: 34 U/L (ref 10–40)
ANION GAP SERPL CALCULATED.3IONS-SCNC: 9 MMOL/L (ref 4–16)
AST SERPL-CCNC: 27 IU/L (ref 15–37)
BASE EXCESS MIXED: 3.2 (ref 0–2.3)
BASOPHILS ABSOLUTE: 0.1 K/CU MM
BASOPHILS RELATIVE PERCENT: 0.7 % (ref 0–1)
BILIRUB SERPL-MCNC: 0.3 MG/DL (ref 0–1)
BUN BLDV-MCNC: 15 MG/DL (ref 6–23)
CALCIUM SERPL-MCNC: 9.5 MG/DL (ref 8.3–10.6)
CHLORIDE BLD-SCNC: 101 MMOL/L (ref 99–110)
CO2: 27 MMOL/L (ref 21–32)
COMMENT: ABNORMAL
CREAT SERPL-MCNC: 1 MG/DL (ref 0.6–1.1)
D DIMER: 240 NG/ML(DDU)
DIFFERENTIAL TYPE: ABNORMAL
EOSINOPHILS ABSOLUTE: 0.4 K/CU MM
EOSINOPHILS RELATIVE PERCENT: 3.4 % (ref 0–3)
GFR AFRICAN AMERICAN: >60 ML/MIN/1.73M2
GFR NON-AFRICAN AMERICAN: 58 ML/MIN/1.73M2
GLUCOSE BLD-MCNC: 90 MG/DL (ref 70–99)
HCO3 VENOUS: 29.9 MMOL/L (ref 19–25)
HCT VFR BLD CALC: 49.8 % (ref 37–47)
HEMOGLOBIN: 15.7 GM/DL (ref 12.5–16)
IMMATURE NEUTROPHIL %: 0.4 % (ref 0–0.43)
INR BLD: 0.81 INDEX
LIPASE: 42 IU/L (ref 13–60)
LYMPHOCYTES ABSOLUTE: 1.9 K/CU MM
LYMPHOCYTES RELATIVE PERCENT: 17.3 % (ref 24–44)
MCH RBC QN AUTO: 29.5 PG (ref 27–31)
MCHC RBC AUTO-ENTMCNC: 31.5 % (ref 32–36)
MCV RBC AUTO: 93.4 FL (ref 78–100)
MONOCYTES ABSOLUTE: 0.7 K/CU MM
MONOCYTES RELATIVE PERCENT: 6.2 % (ref 0–4)
NUCLEATED RBC %: 0 %
O2 SAT, VEN: 80.2 % (ref 50–70)
PCO2, VEN: 53 MMHG (ref 38–52)
PDW BLD-RTO: 12.5 % (ref 11.7–14.9)
PH VENOUS: 7.36 (ref 7.32–7.42)
PLATELET # BLD: 352 K/CU MM (ref 140–440)
PMV BLD AUTO: 8.9 FL (ref 7.5–11.1)
PO2, VEN: 46 MMHG (ref 28–48)
POTASSIUM SERPL-SCNC: 3.9 MMOL/L (ref 3.5–5.1)
PRO-BNP: 56.83 PG/ML
PROTHROMBIN TIME: 10.4 SECONDS (ref 11.7–14.5)
RBC # BLD: 5.33 M/CU MM (ref 4.2–5.4)
SEGMENTED NEUTROPHILS ABSOLUTE COUNT: 7.7 K/CU MM
SEGMENTED NEUTROPHILS RELATIVE PERCENT: 72 % (ref 36–66)
SODIUM BLD-SCNC: 137 MMOL/L (ref 135–145)
TOTAL IMMATURE NEUTOROPHIL: 0.04 K/CU MM
TOTAL NUCLEATED RBC: 0 K/CU MM
TOTAL PROTEIN: 8.3 GM/DL (ref 6.4–8.2)
TROPONIN T: <0.01 NG/ML
WBC # BLD: 10.7 K/CU MM (ref 4–10.5)

## 2021-09-08 PROCEDURE — 71045 X-RAY EXAM CHEST 1 VIEW: CPT

## 2021-09-08 PROCEDURE — 85025 COMPLETE CBC W/AUTO DIFF WBC: CPT

## 2021-09-08 PROCEDURE — 85610 PROTHROMBIN TIME: CPT

## 2021-09-08 PROCEDURE — 93005 ELECTROCARDIOGRAM TRACING: CPT | Performed by: PHYSICIAN ASSISTANT

## 2021-09-08 PROCEDURE — 96372 THER/PROPH/DIAG INJ SC/IM: CPT

## 2021-09-08 PROCEDURE — 85379 FIBRIN DEGRADATION QUANT: CPT

## 2021-09-08 PROCEDURE — 83690 ASSAY OF LIPASE: CPT

## 2021-09-08 PROCEDURE — 6360000002 HC RX W HCPCS: Performed by: PHYSICIAN ASSISTANT

## 2021-09-08 PROCEDURE — 84484 ASSAY OF TROPONIN QUANT: CPT

## 2021-09-08 PROCEDURE — 6370000000 HC RX 637 (ALT 250 FOR IP): Performed by: PHYSICIAN ASSISTANT

## 2021-09-08 PROCEDURE — 83880 ASSAY OF NATRIURETIC PEPTIDE: CPT

## 2021-09-08 PROCEDURE — 99285 EMERGENCY DEPT VISIT HI MDM: CPT

## 2021-09-08 PROCEDURE — 80053 COMPREHEN METABOLIC PANEL: CPT

## 2021-09-08 PROCEDURE — 94150 VITAL CAPACITY TEST: CPT

## 2021-09-08 PROCEDURE — 82805 BLOOD GASES W/O2 SATURATION: CPT

## 2021-09-08 RX ORDER — KETOROLAC TROMETHAMINE 30 MG/ML
30 INJECTION, SOLUTION INTRAMUSCULAR; INTRAVENOUS ONCE
Status: COMPLETED | OUTPATIENT
Start: 2021-09-08 | End: 2021-09-08

## 2021-09-08 RX ORDER — PREDNISONE 10 MG/1
TABLET ORAL
Qty: 18 TABLET | Refills: 0 | Status: SHIPPED | OUTPATIENT
Start: 2021-09-08 | End: 2021-09-18

## 2021-09-08 RX ORDER — HYDROCODONE BITARTRATE AND ACETAMINOPHEN 5; 325 MG/1; MG/1
1 TABLET ORAL EVERY 6 HOURS PRN
Qty: 6 TABLET | Refills: 0 | Status: SHIPPED | OUTPATIENT
Start: 2021-09-08 | End: 2021-09-11

## 2021-09-08 RX ORDER — ACETAMINOPHEN 325 MG/1
650 TABLET ORAL ONCE
Status: COMPLETED | OUTPATIENT
Start: 2021-09-08 | End: 2021-09-08

## 2021-09-08 RX ADMIN — KETOROLAC TROMETHAMINE 30 MG: 30 INJECTION, SOLUTION INTRAMUSCULAR at 12:06

## 2021-09-08 RX ADMIN — ACETAMINOPHEN 650 MG: 325 TABLET ORAL at 12:06

## 2021-09-08 ASSESSMENT — PAIN SCALES - GENERAL
PAINLEVEL_OUTOF10: 10
PAINLEVEL_OUTOF10: 10

## 2021-09-08 NOTE — ED TRIAGE NOTES
Pt to ED with complaints of right sided chest pain since yesterday. Pt states pain is worse with deep breath.

## 2021-09-08 NOTE — ED PROVIDER NOTES
[address] ENCOUNTER        Pt Name: Eduin Akbar  MRN: [id number]  [place] 1970  Date of evaluation: 9/8/2021  Provider: George Hopper PA-C  PCP: Ez Hernandez MD  Note Started: 11:38 AM EDT       PARAMJIT. I have evaluated this patient. My supervising physician was available for consultation. Stella Ritter MD      CHIEF COMPLAINT       Chief Complaint   Patient presents with    Chest Pain     right sided chest/lung pain, shortness of breath       HISTORY OF PRESENT ILLNESS   (Location, Timing/Onset, Context/Setting, Quality, Duration, Modifying Factors, Severity, Associated Signs and Symptoms)  Note limiting factors. Chief Complaint: Chest pain right side    Eduin Akbar is a 45 y.o. female who presents with complaint of right chest pain. Onset yesterday 5:30 PM. Got off work went to the store to get some food items. Picked up her grandbabies and went on home. It was at that time noted pain right lower chest beneath bra line and to the right lateral chest. Worse with deep breath and somewhat with movement. She has never had previous. No history of DVT or PE. Not on hormonal therapy. No fevers or chills. No gastrointestinal or urinary complaints. She reports no injury. No overuse component. No rash reported. She has not short of breath. No history of pneumonia. No history of pneumothorax. No pain around the back or right flank. No pain into the abdomen. The patient is a non-smoker. Nursing Notes were all reviewed and agreed with or any disagreements were addressed in the HPI. REVIEW OF SYSTEMS    (2-9 systems for level 4, 10 or more for level 5)     Review of Systems    Positives and Pertinent negatives as per HPI. Except as noted above in the ROS, all other systems were reviewed and negative.        PAST MEDICAL HISTORY     Past Medical History:   Diagnosis Date    Anesthesia \"have trouble with my blood pressure going really low after surgery\"    Anxiety     Cough     Occ. Nonproductive Cough    Difficulty swallowing     \"Due To The Thyroid\"    History of echocardiogram 10/05/2020    EF 55-60%, E/A reversal, indeterminate diastolic function, no significant valvular abnormalities, no pericardial effusion     History of exercise stress test 10/05/2020    Treadmill, Normal exercise performance without angina and ischemic EKG changes.  Has baseline EKG changes    HX OTHER MEDICAL     \"have to watch, limited ROM of neck since neck surgery\"    Migraine Last Migraine 3-31-16    Shortness of breath     Sinus drainage     Teeth missing     Upper And Lower    Thyroid disease     thyroid nodules\"have had two previous biopsies in the past\"    Wears glasses     Roe teeth extracted     4 [place]oe Teeth Extracted In Past         SURGICAL HISTORY     Past Surgical History:   Procedure Laterality Date    BACK SURGERY  12-14    ACF \"C6, C7\" With Hardware    BREAST ENHANCEMENT SURGERY Bilateral 2000's    \"Breast Implants\"    DENTAL SURGERY      Teeth Extracted In Past    ENDOSCOPY, COLON, DIAGNOSTIC      egd- \"swallowed a safety pin\"- in 6th grade-was in ICU for 3 days\"    THYROID SURGERY  3-30-16 Fine Needle Aspiration Biopsy    THYROIDECTOMY, COMPLETION N/A 04/12/2016    TUBAL LIGATION  1993    WISDOM TOOTH EXTRACTION      4 [place] Teeth Extracted In Past         CURRENTMEDICATIONS       Previous Medications    ATORVASTATIN (LIPITOR) 10 MG TABLET        CALCIUM CITRATE-VITAMIN D (CALCIUM + VIT D, BARIATRIC ADVANTAGE, CHEWABLE TABLET)    Take 1 tablet by mouth 2 times daily    LEVOTHYROXINE (LEVOTHROID) 125 MCG TABLET    Take 1 tablet by mouth Daily    OMEPRAZOLE (PRILOSEC) 40 MG DELAYED RELEASE CAPSULE    Take 40 mg by mouth daily    RIZATRIPTAN (MAXALT) 10 MG TABLET    Take 10 mg by mouth as needed for Migraine     TOPIRAMATE (TOPAMAX) 25 MG TABLET    Take 25 mg by mouth nightly TOPIRAMATE (TOPAMAX) 50 MG TABLET             ALLERGIES     Sulfa antibiotics    FAMILYHISTORY       Family History   Problem Relation Age of Onset    Heart Disease Father         Open Heart Surgery    High Cholesterol Father     Hearing Loss Sister     Arthritis Mother         Rheumatoid Arthritis    Miscarriages / Stillbirths Mother     Substance Abuse Mother         Alcohol    Cancer Sister         Cancer Unsure What Kind    Substance Abuse Brother         Drugs          SOCIAL HISTORY       Social History     Tobacco Use    Smoking status: Never Smoker    Smokeless tobacco: Never Used   Vaping Use    Vaping Use: Never used   Substance Use Topics    Alcohol use: No    Drug use: No       SCREENINGS    [place] Coma Scale  Eye Opening: Spontaneous  Best Verbal Response: Oriented  Best Motor Response: Obeys commands  [place] Coma Scale Score: 15        PHYSICAL EXAM    (up to 7 for level 4, 8 or more for level 5)     ED Triage Vitals [09/08/21 0835]   BP Temp Temp Source Pulse Resp SpO2 Height Weight   111/76 98.3 °F (36.8 °C) Oral 77 18 98 % 5' 7\" (1.702 m) 200 lb (90.7 kg)       Physical Exam  Vitals and nursing note reviewed. Constitutional:       Appearance: Normal appearance. She is well-developed. She is obese. HENT:      Head: Normocephalic and atraumatic. Right Ear: External ear normal.      Left Ear: External ear normal.      Mouth/Throat:      Mouth: Mucous membranes are moist.      Pharynx: Oropharynx is clear. Eyes:      General: No scleral icterus. Right eye: No discharge. Left eye: No discharge. Conjunctiva/sclera: Conjunctivae normal.   Cardiovascular:      Rate and Rhythm: Normal rate and regular rhythm. Heart sounds: Normal heart sounds. Pulmonary:      Effort: Pulmonary effort is normal.      Breath sounds: Normal breath sounds. Comments: Patient with minor chest wall tenderness. However majority pain appears to be pleuritic in nature.   Chest: Chest wall: Tenderness present. Abdominal:      General: Abdomen is flat. Bowel sounds are normal.      Palpations: Abdomen is soft. Tenderness: There is no left CVA tenderness. Musculoskeletal:         General: Normal range of motion. Cervical back: Normal range of motion and neck supple. Right lower leg: No edema. Left lower leg: No edema. Skin:     General: Skin is warm and dry. Findings: No rash. Neurological:      General: No focal deficit present. Mental Status: She is alert and oriented to person, place, and time. Mental status is at baseline. Psychiatric:         Mood and Affect: Mood normal.         Behavior: Behavior normal.         Thought Content: Thought content normal.         Judgment: Judgment normal.         DIAGNOSTIC RESULTS   LABS:    Labs Reviewed   CBC WITH AUTO DIFFERENTIAL - Abnormal; Notable for the following components:       Result Value    WBC 10.7 (*)     Hematocrit 49.8 (*)     MCHC 31.5 (*)     Segs Relative 72.0 (*)     Lymphocytes % 17.3 (*)     Monocytes % 6.2 (*)     Eosinophils % 3.4 (*)     All other components within normal limits   COMPREHENSIVE METABOLIC PANEL W/ REFLEX TO MG FOR LOW K - Abnormal; Notable for the following components: Total Protein 8.3 (*)     GFR Non- 58 (*)     All other components within normal limits   PROTIME-INR - Abnormal; Notable for the following components:    Protime 10.4 (*)     All other components within normal limits   D-DIMER, QUANTITATIVE - Abnormal; Notable for the following components:    D-Dimer, Quant 240 (*)     All other components within normal limits   BLOOD GAS, VENOUS - Abnormal; Notable for the following components:    pCO2, Juan 53 (*)     Base Exc, Mixed 3.2 (*)     HCO3, Venous 29.9 (*)     O2 Sat, Juan 80.2 (*)     All other components within normal limits   LIPASE   TROPONIN   BRAIN NATRIURETIC PEPTIDE       When ordered only abnormal lab results are displayed.  All other labs were within normal range or not returned as of this dictation. EKG: When ordered, EKG's are interpreted by the Emergency Department Physician in the absence of a cardiologist.  Please see their note for interpretation of EKG. RADIOLOGY:   Non-plain film images such as CT, Ultrasound and MRI are read by the radiologist. Plain radiographic images are visualized and preliminarily interpreted by the ED Provider with the below findings:        Interpretation per the Radiologist below, if available at the time of this note:    XR CHEST PORTABLE   Final Result   No acute cardiopulmonary process. XR CHEST PORTABLE    Result Date: 9/8/2021  No acute cardiopulmonary process           PROCEDURES   Unless otherwise noted below, none     Procedures    CRITICAL CARE TIME   N/A    CONSULTS:  None      EMERGENCY DEPARTMENT COURSE and DIFFERENTIAL DIAGNOSIS/MDM:   Vitals:    Vitals:    09/08/21 0835 09/08/21 1206 09/08/21 1209   BP: 111/76 (!) 112/59    Pulse: 77 75    Resp: 18 18    Temp: 98.3 °F (36.8 °C)     TempSrc: Oral     SpO2: 98% 96% 100%   Weight: 200 lb (90.7 kg)     Height: 5' 7\" (1.702 m)         Patient was given the following medications:  Medications   ketorolac (TORADOL) injection 30 mg (30 mg IntraMUSCular Given 9/8/21 1206)   acetaminophen (TYLENOL) tablet 650 mg (650 mg Oral Given 9/8/21 1206)           This patient resenting with 24-hour history of right chest wall pain worse with deep breath and slightly worse with movement. The patient's laboratory studies show a WBC 10.7 with hemoglobin 15.7. The patient CMP shows normal renal and hepatic function. Lipase not elevated at 42. The patient troponin less than 0.01.  proBNP not elevated to 56.83. The patient D-dimer is 240. Age-adjusted is 250. No concern. X-ray negative for acute cardiopulmonary abnormality. Patient likely has pleurisy with a mild chest wall component.   ED treatment consisted of Tylenol 650 mg p.o. and Toradol 30 mg IM. Patient has some improvement. The patient be discharged with a dose prednisone beginning 30 mg of the next 9 days and hydrocodone. Upon completion of prednisone I did recommend ibuprofen as needed. Concurrently she can use Tylenol 1000 mg 3 times daily as needed. Patient did express understanding reticulocyte gnosis and treatment plan. FINAL IMPRESSION      1. Pleurisy    2. Chest wall pain          DISPOSITION/PLAN   DISPOSITION Decision To Discharge 09/08/2021 12:23:26 PM      PATIENT REFERRED TO:  Anita Tierney MD  [address]  [phone]    Schedule an appointment as soon as possible for a visit in 2 days      [facility] Emergency Department  [facility] [id number]  [phone]  Go to   If symptoms worsen      DISCHARGE MEDICATIONS:  New Prescriptions    HYDROCODONE-ACETAMINOPHEN (NORCO) 5-325 MG PER TABLET    Take 1 tablet by mouth every 6 hours as needed for Pain for up to 3 days. PREDNISONE (DELTASONE) 10 MG TABLET    3 tabs po qam for 3 days then 2 tabs qam for 3 days the 1 tab qam for 3 days       DISCONTINUED MEDICATIONS:  Discontinued Medications    No medications on file         Periodic Controlled Substance Monitoring: No signs of potential drug abuse or diversion identified. Isma Sheridan PA-C)    (Please note that portions of this note were completed with a voice recognition program.  Efforts were made to edit the dictations but occasionally words are mis-transcribed. )    Isma Sheridan PA-C (electronically signed)           Isma Sheridan PA-C  09/08/21 9985

## 2021-09-08 NOTE — ED PROVIDER NOTES
This EKG was interpreted by me. Rate is 80, rhythm is sinus. GA and QT intervals are within normal limits. There is no ST segment or T wave changes. ST-T wave abnormalities in the inferior leads, high lateral leads. This was present on previous EKG. This EKG was compared to previous EKG from date 9/23/2020.       Mikael Murillo DO  09/08/21 1021

## 2021-09-09 LAB
EKG ATRIAL RATE: 80 BPM
EKG DIAGNOSIS: NORMAL
EKG P AXIS: 63 DEGREES
EKG P-R INTERVAL: 126 MS
EKG Q-T INTERVAL: 416 MS
EKG QRS DURATION: 74 MS
EKG QTC CALCULATION (BAZETT): 479 MS
EKG R AXIS: 48 DEGREES
EKG T AXIS: 267 DEGREES
EKG VENTRICULAR RATE: 80 BPM

## 2021-09-09 PROCEDURE — 93010 ELECTROCARDIOGRAM REPORT: CPT | Performed by: INTERNAL MEDICINE

## 2021-10-18 NOTE — DISCHARGE SUMMARY
Outpatient Physical Therapy           [place]           [] Phone: [phone]   Fax: [phone]  [facility]           [] Phone: [phone]   Fax: [phone]     To:  Dr. Shakira Nicholson    From: Radha Sauceda PT     Patient: Jeaneth Navarrete     : 1970  Diagnosis:  Adhesive Capsulitis   Treatment Diagnosis:   Adhesive capsulitis of the R shoulder  Date: 10/18/2021    Physical Therapy Discharge Form  Dear Dr. Shakira Nicholson,   The patient has not returned for >30 days for therapy intervention and will be discharged. If you have any questions or concerns please contact the office. Electronically signed by:  Radha Sauceda PT, DPT, [facility] 10/18/2021, 2:25 PM        If you have any questions or concerns, please don't hesitate to call.   Thank you for your referral.      Physician Signature:________________________________Date:_________ TIME: _____  By signing above, therapists plan is approved by physician

## 2024-01-16 ENCOUNTER — APPOINTMENT (OUTPATIENT)
Dept: GENERAL RADIOLOGY | Age: 54
End: 2024-01-16
Payer: COMMERCIAL

## 2024-01-16 ENCOUNTER — ANESTHESIA EVENT (OUTPATIENT)
Dept: OPERATING ROOM | Age: 54
End: 2024-01-16
Payer: COMMERCIAL

## 2024-01-16 ENCOUNTER — HOSPITAL ENCOUNTER (INPATIENT)
Age: 54
LOS: 4 days | Discharge: HOME | End: 2024-01-20
Attending: EMERGENCY MEDICINE | Admitting: INTERNAL MEDICINE
Payer: COMMERCIAL
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
## 2024-01-16 DIAGNOSIS — S82.252A CLOSED DISPLACED COMMINUTED FRACTURE OF SHAFT OF LEFT TIBIA, INITIAL ENCOUNTER: Primary | ICD-10-CM

## 2024-01-16 DIAGNOSIS — S82.832A CLOSED FRACTURE OF HEAD OF LEFT FIBULA, INITIAL ENCOUNTER: ICD-10-CM

## 2024-01-16 DIAGNOSIS — S82.232A CLOSED DISPLACED OBLIQUE FRACTURE OF SHAFT OF LEFT TIBIA, INITIAL ENCOUNTER: ICD-10-CM

## 2024-01-16 PROBLEM — S82.262A: Status: ACTIVE | Noted: 2024-01-16

## 2024-01-16 LAB
ANION GAP SERPL CALCULATED.3IONS-SCNC: 12 MMOL/L (ref 7–16)
APTT: 27 SECONDS (ref 25.1–37.1)
BASOPHILS ABSOLUTE: 0.1 K/CU MM
BASOPHILS RELATIVE PERCENT: 0.4 % (ref 0–1)
BUN SERPL-MCNC: 14 MG/DL (ref 6–23)
CALCIUM SERPL-MCNC: 8.8 MG/DL (ref 8.3–10.6)
CHLORIDE BLD-SCNC: 107 MMOL/L (ref 99–110)
CO2: 23 MMOL/L (ref 21–32)
CREAT SERPL-MCNC: 0.9 MG/DL (ref 0.6–1.1)
DIFFERENTIAL TYPE: ABNORMAL
EOSINOPHILS ABSOLUTE: 0.1 K/CU MM
EOSINOPHILS RELATIVE PERCENT: 0.9 % (ref 0–3)
GFR SERPL CREATININE-BSD FRML MDRD: >60 ML/MIN/1.73M2
GLUCOSE SERPL-MCNC: 94 MG/DL (ref 70–99)
HCT VFR BLD CALC: 42.8 % (ref 37–47)
HEMOGLOBIN: 14 GM/DL (ref 12.5–16)
IMMATURE NEUTROPHIL %: 0.4 % (ref 0–0.43)
INR BLD: 1 INDEX
LYMPHOCYTES ABSOLUTE: 1.4 K/CU MM
LYMPHOCYTES RELATIVE PERCENT: 11.9 % (ref 24–44)
MCH RBC QN AUTO: 29.3 PG (ref 27–31)
MCHC RBC AUTO-ENTMCNC: 32.7 % (ref 32–36)
MCV RBC AUTO: 89.5 FL (ref 78–100)
MONOCYTES ABSOLUTE: 0.7 K/CU MM
MONOCYTES RELATIVE PERCENT: 6 % (ref 0–4)
NUCLEATED RBC %: 0 %
PDW BLD-RTO: 13.2 % (ref 11.7–14.9)
PLATELET # BLD: 279 K/CU MM (ref 140–440)
PMV BLD AUTO: 9.1 FL (ref 7.5–11.1)
POTASSIUM SERPL-SCNC: 4.3 MMOL/L (ref 3.5–5.1)
PROTHROMBIN TIME: 12.9 SECONDS (ref 11.7–14.5)
RBC # BLD: 4.78 M/CU MM (ref 4.2–5.4)
SEGMENTED NEUTROPHILS ABSOLUTE COUNT: 9.4 K/CU MM
SEGMENTED NEUTROPHILS RELATIVE PERCENT: 80.4 % (ref 36–66)
SODIUM BLD-SCNC: 142 MMOL/L (ref 135–145)
TOTAL IMMATURE NEUTOROPHIL: 0.05 K/CU MM
TOTAL NUCLEATED RBC: 0 K/CU MM
WBC # BLD: 11.7 K/CU MM (ref 4–10.5)

## 2024-01-16 PROCEDURE — 73630 X-RAY EXAM OF FOOT: CPT

## 2024-01-16 PROCEDURE — 85025 COMPLETE CBC W/AUTO DIFF WBC: CPT

## 2024-01-16 PROCEDURE — 99285 EMERGENCY DEPT VISIT HI MDM: CPT

## 2024-01-16 PROCEDURE — 73562 X-RAY EXAM OF KNEE 3: CPT

## 2024-01-16 PROCEDURE — 80048 BASIC METABOLIC PNL TOTAL CA: CPT

## 2024-01-16 PROCEDURE — 6360000002 HC RX W HCPCS: Performed by: INTERNAL MEDICINE

## 2024-01-16 PROCEDURE — 96376 TX/PRO/DX INJ SAME DRUG ADON: CPT

## 2024-01-16 PROCEDURE — 6370000000 HC RX 637 (ALT 250 FOR IP): Performed by: INTERNAL MEDICINE

## 2024-01-16 PROCEDURE — 6360000002 HC RX W HCPCS: Performed by: EMERGENCY MEDICINE

## 2024-01-16 PROCEDURE — 85730 THROMBOPLASTIN TIME PARTIAL: CPT

## 2024-01-16 PROCEDURE — 73610 X-RAY EXAM OF ANKLE: CPT

## 2024-01-16 PROCEDURE — 1200000000 HC SEMI PRIVATE

## 2024-01-16 PROCEDURE — 2580000003 HC RX 258: Performed by: INTERNAL MEDICINE

## 2024-01-16 PROCEDURE — 85610 PROTHROMBIN TIME: CPT

## 2024-01-16 PROCEDURE — 73590 X-RAY EXAM OF LOWER LEG: CPT

## 2024-01-16 PROCEDURE — 94761 N-INVAS EAR/PLS OXIMETRY MLT: CPT

## 2024-01-16 PROCEDURE — 96374 THER/PROPH/DIAG INJ IV PUSH: CPT

## 2024-01-16 RX ORDER — ENOXAPARIN SODIUM 100 MG/ML
40 INJECTION SUBCUTANEOUS DAILY
Status: DISCONTINUED | OUTPATIENT
Start: 2024-01-16 | End: 2024-01-17

## 2024-01-16 RX ORDER — POTASSIUM CHLORIDE 20 MEQ/1
40 TABLET, EXTENDED RELEASE ORAL PRN
Status: DISCONTINUED | OUTPATIENT
Start: 2024-01-16 | End: 2024-01-20 | Stop reason: HOSPADM

## 2024-01-16 RX ORDER — MORPHINE SULFATE 4 MG/ML
4 INJECTION, SOLUTION INTRAMUSCULAR; INTRAVENOUS EVERY 4 HOURS PRN
Status: DISCONTINUED | OUTPATIENT
Start: 2024-01-16 | End: 2024-01-17

## 2024-01-16 RX ORDER — ROSUVASTATIN CALCIUM 20 MG/1
40 TABLET, COATED ORAL EVERY EVENING
Status: DISCONTINUED | OUTPATIENT
Start: 2024-01-16 | End: 2024-01-20 | Stop reason: HOSPADM

## 2024-01-16 RX ORDER — ACETAMINOPHEN 325 MG/1
650 TABLET ORAL EVERY 6 HOURS PRN
Status: DISCONTINUED | OUTPATIENT
Start: 2024-01-16 | End: 2024-01-20 | Stop reason: HOSPADM

## 2024-01-16 RX ORDER — CYCLOBENZAPRINE HCL 10 MG
10 TABLET ORAL 3 TIMES DAILY PRN
Status: DISCONTINUED | OUTPATIENT
Start: 2024-01-16 | End: 2024-01-20 | Stop reason: HOSPADM

## 2024-01-16 RX ORDER — ACETAMINOPHEN 650 MG/1
650 SUPPOSITORY RECTAL EVERY 6 HOURS PRN
Status: DISCONTINUED | OUTPATIENT
Start: 2024-01-16 | End: 2024-01-20 | Stop reason: HOSPADM

## 2024-01-16 RX ORDER — VILAZODONE HYDROCHLORIDE 20 MG/1
20 TABLET ORAL DAILY
COMMUNITY

## 2024-01-16 RX ORDER — SODIUM CHLORIDE 9 MG/ML
INJECTION, SOLUTION INTRAVENOUS PRN
Status: DISCONTINUED | OUTPATIENT
Start: 2024-01-16 | End: 2024-01-20 | Stop reason: HOSPADM

## 2024-01-16 RX ORDER — LEVOTHYROXINE SODIUM 0.15 MG/1
150 TABLET ORAL DAILY
COMMUNITY

## 2024-01-16 RX ORDER — MAGNESIUM SULFATE IN WATER 40 MG/ML
2000 INJECTION, SOLUTION INTRAVENOUS PRN
Status: DISCONTINUED | OUTPATIENT
Start: 2024-01-16 | End: 2024-01-20 | Stop reason: HOSPADM

## 2024-01-16 RX ORDER — VILAZODONE HYDROCHLORIDE 10 MG/1
20 TABLET ORAL DAILY
Status: DISCONTINUED | OUTPATIENT
Start: 2024-01-16 | End: 2024-01-20 | Stop reason: HOSPADM

## 2024-01-16 RX ORDER — HYDROCODONE BITARTRATE AND ACETAMINOPHEN 5; 325 MG/1; MG/1
1 TABLET ORAL EVERY 4 HOURS PRN
Status: DISCONTINUED | OUTPATIENT
Start: 2024-01-16 | End: 2024-01-20 | Stop reason: HOSPADM

## 2024-01-16 RX ORDER — LEVOTHYROXINE SODIUM 0.07 MG/1
150 TABLET ORAL DAILY
Status: DISCONTINUED | OUTPATIENT
Start: 2024-01-16 | End: 2024-01-20 | Stop reason: HOSPADM

## 2024-01-16 RX ORDER — MORPHINE SULFATE 4 MG/ML
4 INJECTION, SOLUTION INTRAMUSCULAR; INTRAVENOUS EVERY 30 MIN PRN
Status: DISCONTINUED | OUTPATIENT
Start: 2024-01-16 | End: 2024-01-16

## 2024-01-16 RX ORDER — SODIUM CHLORIDE 0.9 % (FLUSH) 0.9 %
5-40 SYRINGE (ML) INJECTION PRN
Status: DISCONTINUED | OUTPATIENT
Start: 2024-01-16 | End: 2024-01-20 | Stop reason: HOSPADM

## 2024-01-16 RX ORDER — SUMATRIPTAN 50 MG/1
50 TABLET, FILM COATED ORAL DAILY PRN
Status: DISCONTINUED | OUTPATIENT
Start: 2024-01-16 | End: 2024-01-20 | Stop reason: HOSPADM

## 2024-01-16 RX ORDER — POTASSIUM CHLORIDE 7.45 MG/ML
10 INJECTION INTRAVENOUS PRN
Status: DISCONTINUED | OUTPATIENT
Start: 2024-01-16 | End: 2024-01-20 | Stop reason: HOSPADM

## 2024-01-16 RX ORDER — SODIUM CHLORIDE, SODIUM LACTATE, POTASSIUM CHLORIDE, CALCIUM CHLORIDE 600; 310; 30; 20 MG/100ML; MG/100ML; MG/100ML; MG/100ML
INJECTION, SOLUTION INTRAVENOUS CONTINUOUS
Status: DISPENSED | OUTPATIENT
Start: 2024-01-16 | End: 2024-01-17

## 2024-01-16 RX ORDER — ONDANSETRON 4 MG/1
4 TABLET, ORALLY DISINTEGRATING ORAL EVERY 8 HOURS PRN
Status: DISCONTINUED | OUTPATIENT
Start: 2024-01-16 | End: 2024-01-20 | Stop reason: HOSPADM

## 2024-01-16 RX ORDER — ROSUVASTATIN CALCIUM 10 MG/1
10 TABLET, COATED ORAL DAILY
COMMUNITY
End: 2024-01-16 | Stop reason: CLARIF

## 2024-01-16 RX ORDER — KETOROLAC TROMETHAMINE 30 MG/ML
30 INJECTION, SOLUTION INTRAMUSCULAR; INTRAVENOUS EVERY 6 HOURS
Status: COMPLETED | OUTPATIENT
Start: 2024-01-16 | End: 2024-01-17

## 2024-01-16 RX ORDER — SODIUM CHLORIDE 0.9 % (FLUSH) 0.9 %
5-40 SYRINGE (ML) INJECTION EVERY 12 HOURS SCHEDULED
Status: DISCONTINUED | OUTPATIENT
Start: 2024-01-16 | End: 2024-01-20 | Stop reason: HOSPADM

## 2024-01-16 RX ORDER — ROSUVASTATIN CALCIUM 40 MG/1
40 TABLET, COATED ORAL EVERY EVENING
COMMUNITY

## 2024-01-16 RX ORDER — ONDANSETRON 2 MG/ML
4 INJECTION INTRAMUSCULAR; INTRAVENOUS EVERY 6 HOURS PRN
Status: DISCONTINUED | OUTPATIENT
Start: 2024-01-16 | End: 2024-01-20 | Stop reason: HOSPADM

## 2024-01-16 RX ORDER — POLYETHYLENE GLYCOL 3350 17 G/17G
17 POWDER, FOR SOLUTION ORAL DAILY PRN
Status: DISCONTINUED | OUTPATIENT
Start: 2024-01-16 | End: 2024-01-20 | Stop reason: HOSPADM

## 2024-01-16 RX ADMIN — ENOXAPARIN SODIUM 40 MG: 100 INJECTION SUBCUTANEOUS at 17:55

## 2024-01-16 RX ADMIN — MORPHINE SULFATE 4 MG: 4 INJECTION, SOLUTION INTRAMUSCULAR; INTRAVENOUS at 11:02

## 2024-01-16 RX ADMIN — VILAZODONE HYDROCHLORIDE 20 MG: 10 TABLET, FILM COATED ORAL at 20:13

## 2024-01-16 RX ADMIN — SODIUM CHLORIDE, POTASSIUM CHLORIDE, SODIUM LACTATE AND CALCIUM CHLORIDE: 600; 310; 30; 20 INJECTION, SOLUTION INTRAVENOUS at 17:36

## 2024-01-16 RX ADMIN — HYDROCODONE BITARTRATE AND ACETAMINOPHEN 1 TABLET: 5; 325 TABLET ORAL at 20:20

## 2024-01-16 RX ADMIN — ROSUVASTATIN CALCIUM 40 MG: 20 TABLET, FILM COATED ORAL at 20:13

## 2024-01-16 RX ADMIN — HYDROCODONE BITARTRATE AND ACETAMINOPHEN 1 TABLET: 5; 325 TABLET ORAL at 15:43

## 2024-01-16 RX ADMIN — MORPHINE SULFATE 4 MG: 4 INJECTION, SOLUTION INTRAMUSCULAR; INTRAVENOUS at 12:13

## 2024-01-16 RX ADMIN — KETOROLAC TROMETHAMINE 30 MG: 30 INJECTION, SOLUTION INTRAMUSCULAR; INTRAVENOUS at 17:55

## 2024-01-16 RX ADMIN — CYCLOBENZAPRINE 10 MG: 10 TABLET, FILM COATED ORAL at 16:45

## 2024-01-16 RX ADMIN — MORPHINE SULFATE 4 MG: 4 INJECTION, SOLUTION INTRAMUSCULAR; INTRAVENOUS at 14:08

## 2024-01-16 ASSESSMENT — PAIN DESCRIPTION - FREQUENCY: FREQUENCY: CONTINUOUS

## 2024-01-16 ASSESSMENT — PAIN DESCRIPTION - ONSET: ONSET: ON-GOING

## 2024-01-16 ASSESSMENT — PAIN SCALES - GENERAL
PAINLEVEL_OUTOF10: 5
PAINLEVEL_OUTOF10: 10
PAINLEVEL_OUTOF10: 6
PAINLEVEL_OUTOF10: 10
PAINLEVEL_OUTOF10: 0

## 2024-01-16 ASSESSMENT — PAIN DESCRIPTION - LOCATION
LOCATION: LEG
LOCATION: LEG;ANKLE
LOCATION: LEG
LOCATION: LEG
LOCATION: KNEE
LOCATION: LEG

## 2024-01-16 ASSESSMENT — PAIN DESCRIPTION - ORIENTATION
ORIENTATION: LEFT
ORIENTATION: LEFT;LOWER
ORIENTATION: LEFT
ORIENTATION: LEFT

## 2024-01-16 ASSESSMENT — PAIN - FUNCTIONAL ASSESSMENT
PAIN_FUNCTIONAL_ASSESSMENT: PREVENTS OR INTERFERES SOME ACTIVE ACTIVITIES AND ADLS
PAIN_FUNCTIONAL_ASSESSMENT: PREVENTS OR INTERFERES WITH ALL ACTIVE AND SOME PASSIVE ACTIVITIES
PAIN_FUNCTIONAL_ASSESSMENT: PREVENTS OR INTERFERES SOME ACTIVE ACTIVITIES AND ADLS
PAIN_FUNCTIONAL_ASSESSMENT: 0-10

## 2024-01-16 ASSESSMENT — PAIN DESCRIPTION - DESCRIPTORS
DESCRIPTORS: ACHING
DESCRIPTORS: ACHING;BURNING
DESCRIPTORS: ACHING;BURNING;SHARP;STABBING

## 2024-01-16 ASSESSMENT — PAIN DESCRIPTION - PAIN TYPE
TYPE: ACUTE PAIN
TYPE: ACUTE PAIN

## 2024-01-16 NOTE — ANESTHESIA PRE PROCEDURE
1993   • WISDOM TOOTH EXTRACTION      4 [place] Teeth Extracted In Past       Social History:    Social History     Tobacco Use   • Smoking status: Never   • Smokeless tobacco: Never   Substance Use Topics   • Alcohol use: No                                Counseling given: Not Answered      Vital Signs (Current):   Vitals:    01/16/24 1532 01/16/24 1547 01/16/24 1602 01/16/24 1611   BP: 122/69 (!) 135/59 110/64    Pulse: 50 54 (!) 45 50   Resp: 12 13 11 11   Temp:       TempSrc:       SpO2: 98% 100% 98% 99%   Weight:       Height:                                                  BP Readings from Last 3 Encounters:   01/16/24 110/64   09/08/21 (!) 112/59   10/27/20 114/82       NPO Status:                                                                                 BMI:   Wt Readings from Last 3 Encounters:   01/16/24 77.1 kg (170 lb)   09/08/21 90.7 kg (200 lb)   08/10/21 90.7 kg (200 lb)     Body mass index is 26.63 kg/m².    CBC:   Lab Results   Component Value Date/Time    WBC 11.7 01/16/2024 01:35 PM    RBC 4.78 01/16/2024 01:35 PM    HGB 14.0 01/16/2024 01:35 PM    HCT 42.8 01/16/2024 01:35 PM    MCV 89.5 01/16/2024 01:35 PM    RDW 13.2 01/16/2024 01:35 PM     01/16/2024 01:35 PM       CMP:   Lab Results   Component Value Date/Time     01/16/2024 01:35 PM    K 4.3 01/16/2024 01:35 PM     01/16/2024 01:35 PM    CO2 23 01/16/2024 01:35 PM    BUN 14 01/16/2024 01:35 PM    CREATININE 0.9 01/16/2024 01:35 PM    GFRAA >60 09/08/2021 11:20 AM    LABGLOM >60 01/16/2024 01:35 PM    GLUCOSE 94 01/16/2024 01:35 PM    PROT 8.3 09/08/2021 11:20 AM    CALCIUM 8.8 01/16/2024 01:35 PM    BILITOT 0.3 09/08/2021 11:20 AM    ALKPHOS 128 09/08/2021 11:20 AM    AST 27 09/08/2021 11:20 AM    ALT 34 09/08/2021 11:20 AM       POC Tests: No results for input(s): \"POCGLU\", \"POCNA\", \"POCK\", \"POCCL\", \"POCBUN\", \"POCHEMO\", \"POCHCT\" in the last 72 hours.    Coags:   Lab Results   Component Value Date/Time    PROTIME

## 2024-01-16 NOTE — H&P
V2.0  History and Physical      Name:  Suleiman Mcghee /Age/Sex: 1970  (53 y.o. female)   MRN & CSN:  [id number] & [id number] Encounter Date/Time: 2024 1:05 PM EST   Location:  [place] [address] PCP: Jayy Diana MD       Hospital Day: 1    Assessment and Plan:   Suleiman Mcghee is a 53 y.o. female who presents with Closed displaced segmental fracture of shaft of left tibia, initial encounter    Hospital Problems             Last Modified POA    * (Principal) Closed displaced segmental fracture of shaft of left tibia, initial encounter 2024 Yes     #Acute displaced distal tibial shaft fracture   #Acute nondisplaced comminuted proximal fibular   #Concern for osteoporosis  -slipped on ice while on a walk  morning  -has had prior breaks in the past  -Patient's mother did have osteoporosis  -Ortho consulted by ED  Check vitamin D level in the morning  Ortho on consult, appreciate recs  NPO at midnight for surgical repair tomorrow  Morphine IV and norco po for pain control    #Hypothyroidism  Continue levothyroxine       Disposition:   Current Living situation: home  Expected Disposition: home vs rehab  Estimated D/C: 1-2 days    Diet No diet orders on file   DVT Prophylaxis [x] Lovenox, []  Heparin, [] SCDs, [] Ambulation,  [] Eliquis, [] Xarelto, [] Coumadin   Code Status Prior   Surrogate Decision Maker/ POA spouse     Personally reviewed Lab Studies and Imaging     Discussed management of the case with ED provider who recommended admission    Imaging that was interpreted personally includes xray and results L tibia fracture    Drugs that require monitoring for toxicity include lovenox and the method of monitoring was CBC        History from:     patient    History of Present Illness:     Chief Complaint: fall  Suleiman Mcghee is a 53 y.o. female with pmh of hypothyroidism who presents with LLE pain after a fall.  Patient states that she went on a walk as she usually does and  ORDERING SYSTEM PROVIDED HISTORY: fall, deformity TECHNOLOGIST PROVIDED HISTORY: Reason for exam:->fall, deformity Reason for Exam: pain and deformity after fall FINDINGS: Left knee: The tricompartmental joint spaces appear maintained.  No discrete joint effusion.  The distal femur and patella appear intact. Left tibia and fibula: Acute displaced spiral fracture of the distal tibial shaft.  There is focal anterior and lateral soft tissue swelling.  Acute nondisplaced comminuted fracture of the proximal fibula metaphysis extending to the proximal tibiofibular joint. Left ankle and left foot: Osseous fragments adjacent to the calcaneus could represent avulsion fracture of the Achilles versus fragmented enthesophyte. The tibial plafond and talar dome appear intact.  Suboptimal oblique view somewhat limits evaluation.  Overlying artifact obscures the fine anatomic detail.  The tarsometatarsal alignment appears grossly maintained, although suboptimal patient positioning somewhat limits evaluation.  No discrete fracture of the forefoot is identified.  Mild diffuse soft tissue swelling.     1. Acute displaced distal tibial shaft fracture. 2. Acute nondisplaced comminuted proximal fibular head fracture. 3. Osseous fragments at the posterior margin of the calcaneus could represent an acute avulsion fracture versus fragmented enthesophyte.  Recommend point tenderness. 4. Mild diffuse soft tissue swelling.     XR ANKLE LEFT (MIN 3 VIEWS)    Result Date: 1/16/2024  EXAMINATION: TWO XRAY VIEWS OF THE LEFT TIBIA AND FIBULA; THREE XRAY VIEWS OF THE LEFT KNEE; THREE XRAY VIEWS OF THE LEFT ANKLE; THREE XRAY VIEWS OF THE LEFT FOOT 1/16/2024 11:27 am COMPARISON: None HISTORY: ORDERING SYSTEM PROVIDED HISTORY: fall, deformity TECHNOLOGIST PROVIDED HISTORY: Reason for exam:->fall, deformity Reason for Exam: pain and deformity after fall FINDINGS: Left knee: The tricompartmental joint spaces appear maintained.  No discrete joint

## 2024-01-16 NOTE — PROGRESS NOTES
4 Eyes Skin Assessment     NAME:  Suleiman Mcghee  YOB: 1970  MEDICAL RECORD NUMBER:  [id number]    The patient is being assessed for  Admission    I agree that at least one RN has performed a thorough Head to Toe Skin Assessment on the patient. ALL assessment sites listed below have been assessed.      Areas assessed by both nurses:    Head, Face, Ears, Shoulders, Back, Chest, Arms, Elbows, Hands, Sacrum. Buttock, Coccyx, Ischium, and Legs. Feet and Heels        Does the Patient have a Wound? No noted wound(s)       Gerardo Prevention initiated by RN: Yes  Wound Care Orders initiated by RN: No    Pressure Injury (Stage 3,4, Unstageable, DTI, NWPT, and Complex wounds) if present, place Wound referral order by RN under : No    New Ostomies, if present place, Ostomy referral order under : No     Nurse 1 eSignature: Electronically signed by Ike Jefferson LPN on 1/16/24 at 5:44 PM EST    **SHARE this note so that the co-signing nurse can place an eSignature**    Nurse 2 eSignature: Electronically signed by Rica Coleman RN on 1/16/24 at 6:11 PM EST

## 2024-01-16 NOTE — ED PROVIDER NOTES
Emergency Department Encounter    Patient: Suleiman Mcghee  MRN: [id number]  : 1970  Date of Evaluation: 2024  ED Provider:  Abbey Ingram MD    Triage Chief Complaint:   Fall (Patient slipped on the ice) and Leg Injury (L leg. Obvious deformity given 100mcg of fentanyl in route)    [place]:  Suleiman Mcghee is a 53 y.o. female that presents  with concern for left leg injury.  She walks on the track at work every day, she did not realize there was ice and was walking and slipped.  Felt the left leg crack as it went down.  Had obvious deformity per EMS, was given 100 mcg total of fentanyl and route.  She is able to wiggle her toes, able to feel her toes.  She is not on blood thinners.  Denies striking her head or back, denies landing on anything else.    ROS - see HPI, below listed is current ROS at time of my eval:  10 systems reviewed and negative except as above.     Past Medical History:   Diagnosis Date    Anesthesia     \"have trouble with my blood pressure going really low after surgery\"    Anxiety     Cough     Occ. Nonproductive Cough    Difficulty swallowing     \"Due To The Thyroid\"    History of echocardiogram 10/05/2020    EF 55-60%, E/A reversal, indeterminate diastolic function, no significant valvular abnormalities, no pericardial effusion     History of exercise stress test 10/05/2020    Treadmill, Normal exercise performance without angina and ischemic EKG changes. Has baseline EKG changes    HX OTHER MEDICAL     \"have to watch, limited ROM of neck since neck surgery\"    Migraine Last Migraine 3--16    Shortness of breath     Sinus drainage     Teeth missing     Upper And Lower    Thyroid disease     thyroid nodules\"have had two previous biopsies in the past\"    Wears glasses     [place] teeth extracted     4 [place] Teeth Extracted In Past     Past Surgical History:   Procedure Laterality Date    BACK SURGERY  12-14    ACF \"C6, C7\" With Hardware    BREAST ENHANCEMENT SURGERY Bilateral

## 2024-01-16 NOTE — PROGRESS NOTES
Full consult to follow.    Patient was admitted with displaced tibia fracture.  Patient will benefit from intramedullary nailing of the tibia.  N.p.o. after midnight.  Plan for surgery tomorrow.  Will discuss with patient in detail tomorrow morning.

## 2024-01-16 NOTE — ED NOTES
1226 paged Dr Sage    [phone] Dr Sage returned call   
1234 paged Dr Martinez     [phone] Dr Martinez returned call   
1510 called Yin Mcpherson on 1N to notify that SBAR is in on patient.  
Care and report to ROMA Canas  
Medication History  [facility]    Patient Name: Suleiman Mcghee 1970     Medication history has been completed by: Gerardo Villegas, Pharmacy Student    Source(s) of information: patient, insurance report     Primary Care Physician: Jayy Diana MD     Pharmacy: [facility]    Allergies as of 01/16/2024 - Fully Reviewed 01/16/2024   Allergen Reaction Noted    Sulfa antibiotics Hives         Prior to Admission medications    Medication Sig Start Date End Date Taking? Authorizing Provider   levothyroxine (SYNTHROID) 150 MCG tablet Take 1 tablet by mouth Daily   Yes ProviderAamir MD   rosuvastatin (CRESTOR) 40 MG tablet Take 1 tablet by mouth every evening   Yes Provider, MD Aamir   cariprazine hcl (VRAYLAR) 1.5 MG capsule Take 1 capsule by mouth daily   Yes Aamir Sanchez MD   vilazodone HCl (VIIBRYD) 20 MG TABS Take 1 tablet by mouth daily   Yes ProviderAamir MD   rizatriptan (MAXALT) 10 MG tablet Take 1 tablet by mouth as needed for Migraine    ProviderAamir MD   topiramate (TOPAMAX) 25 MG tablet Take 25 mg by mouth nightly   Patient not taking: Reported on 8/10/2021    ProviderAamir MD       Medications added or changed (ex. new medication, dosage change, interval change, formulation change):  Cariprazine (added)  Vilazodone (added)  Atorvastatin changed to rosuvastatin 40mg  Levothyroxine dose changed from 125 to 150mcg  Topiramate dose changed from 50 mg to 25 mg    Medications removed from list (include reason, ex. noncompliance, medication cost, therapy complete etc.):   Calcium + vitamin D (removed)  Omeprazole (removed)    Comments:  Spoke with patient at bedside    To my knowledge the above medication history is accurate as of 1/16/2024 12:18 PM.   Gerardo Villegas, Pharmacy Student  1/16/2024 12:18 PM    
calcaneus could represent   an acute avulsion fracture versus fragmented enthesophyte.  Recommend point   tenderness.   4. Mild diffuse soft tissue swelling.           Abnormal labs:   Abnormal Labs Reviewed   CBC WITH AUTO DIFFERENTIAL - Abnormal; Notable for the following components:       Result Value    WBC 11.7 (*)     Segs Relative 80.4 (*)     Lymphocytes % 11.9 (*)     Monocytes % 6.0 (*)     All other components within normal limits       Background  History:   Past Medical History:   Diagnosis Date    Anesthesia     \"have trouble with my blood pressure going really low after surgery\"    Anxiety     Cough     Occ. Nonproductive Cough    Difficulty swallowing     \"Due To The Thyroid\"    History of echocardiogram 10/05/2020    EF 55-60%, E/A reversal, indeterminate diastolic function, no significant valvular abnormalities, no pericardial effusion     History of exercise stress test 10/05/2020    Treadmill, Normal exercise performance without angina and ischemic EKG changes. Has baseline EKG changes    HX OTHER MEDICAL     \"have to watch, limited ROM of neck since neck surgery\"    Migraine Last Migraine 3-31-16    Shortness of breath     Sinus drainage     Teeth missing     Upper And Lower    Thyroid disease     thyroid nodules\"have had two previous biopsies in the past\"    Wears glasses     [place] teeth extracted     4 [place] Teeth Extracted In Past       Assessment    Vitals: MEWS Score: 1  Level of Consciousness: Alert (0)   Vitals:    01/16/24 1023 01/16/24 1332   BP: 131/72 121/76   Pulse: 62 (!) 47   Resp: 20 12   Temp: 97.9 °F (36.6 °C)    TempSrc: Oral    SpO2: 98% 97%   Weight: 77.1 kg (170 lb)    Height: 1.702 m (5' 7\")      PO Status: Nothing by Mouth  O2 Flow Rate:      Cardiac Rhythm: sinus shannon  Last documented pain medication administered: 1408  NIH Score: NIH     Active LDA's:   Peripheral IV 01/16/24 Right Antecubital (Active)       Pertinent or High Risk Medications/Drips: no   If Yes,

## 2024-01-17 ENCOUNTER — ANESTHESIA (OUTPATIENT)
Dept: OPERATING ROOM | Age: 54
End: 2024-01-17
Payer: COMMERCIAL

## 2024-01-17 ENCOUNTER — APPOINTMENT (OUTPATIENT)
Dept: GENERAL RADIOLOGY | Age: 54
End: 2024-01-17
Payer: COMMERCIAL

## 2024-01-17 PROBLEM — S82.232A CLOSED DISPLACED OBLIQUE FRACTURE OF SHAFT OF LEFT TIBIA: Status: ACTIVE | Noted: 2024-01-16

## 2024-01-17 PROBLEM — S82.832A CLOSED FRACTURE OF HEAD OF LEFT FIBULA: Status: ACTIVE | Noted: 2024-01-17

## 2024-01-17 PROBLEM — S82.252A CLOSED DISPLACED COMMINUTED FRACTURE OF SHAFT OF LEFT TIBIA: Status: ACTIVE | Noted: 2024-01-16

## 2024-01-17 LAB
25(OH)D3 SERPL-MCNC: 77.3 NG/ML
ANION GAP SERPL CALCULATED.3IONS-SCNC: 9 MMOL/L (ref 7–16)
BASOPHILS ABSOLUTE: 0.1 K/CU MM
BASOPHILS RELATIVE PERCENT: 0.6 % (ref 0–1)
BUN SERPL-MCNC: 13 MG/DL (ref 6–23)
CALCIUM SERPL-MCNC: 8.6 MG/DL (ref 8.3–10.6)
CHLORIDE BLD-SCNC: 108 MMOL/L (ref 99–110)
CO2: 25 MMOL/L (ref 21–32)
CREAT SERPL-MCNC: 0.9 MG/DL (ref 0.6–1.1)
DIFFERENTIAL TYPE: ABNORMAL
EOSINOPHILS ABSOLUTE: 0.4 K/CU MM
EOSINOPHILS RELATIVE PERCENT: 4.6 % (ref 0–3)
GFR SERPL CREATININE-BSD FRML MDRD: >60 ML/MIN/1.73M2
GLUCOSE SERPL-MCNC: 104 MG/DL (ref 70–99)
HCT VFR BLD CALC: 40.1 % (ref 37–47)
HEMOGLOBIN: 12.9 GM/DL (ref 12.5–16)
IMMATURE NEUTROPHIL %: 0.1 % (ref 0–0.43)
LYMPHOCYTES ABSOLUTE: 2.5 K/CU MM
LYMPHOCYTES RELATIVE PERCENT: 30.8 % (ref 24–44)
MCH RBC QN AUTO: 29.2 PG (ref 27–31)
MCHC RBC AUTO-ENTMCNC: 32.2 % (ref 32–36)
MCV RBC AUTO: 90.7 FL (ref 78–100)
MONOCYTES ABSOLUTE: 0.6 K/CU MM
MONOCYTES RELATIVE PERCENT: 7.8 % (ref 0–4)
NUCLEATED RBC %: 0 %
PDW BLD-RTO: 13.4 % (ref 11.7–14.9)
PLATELET # BLD: 270 K/CU MM (ref 140–440)
PMV BLD AUTO: 9.3 FL (ref 7.5–11.1)
POTASSIUM SERPL-SCNC: 4.6 MMOL/L (ref 3.5–5.1)
RBC # BLD: 4.42 M/CU MM (ref 4.2–5.4)
SEGMENTED NEUTROPHILS ABSOLUTE COUNT: 4.6 K/CU MM
SEGMENTED NEUTROPHILS RELATIVE PERCENT: 56.1 % (ref 36–66)
SODIUM BLD-SCNC: 142 MMOL/L (ref 135–145)
TOTAL IMMATURE NEUTOROPHIL: 0.01 K/CU MM
TOTAL NUCLEATED RBC: 0 K/CU MM
WBC # BLD: 8.2 K/CU MM (ref 4–10.5)

## 2024-01-17 PROCEDURE — 6360000002 HC RX W HCPCS: Performed by: NURSE ANESTHETIST, CERTIFIED REGISTERED

## 2024-01-17 PROCEDURE — C1769 GUIDE WIRE: HCPCS | Performed by: ORTHOPAEDIC SURGERY

## 2024-01-17 PROCEDURE — 3600000014 HC SURGERY LEVEL 4 ADDTL 15MIN: Performed by: ORTHOPAEDIC SURGERY

## 2024-01-17 PROCEDURE — 2500000003 HC RX 250 WO HCPCS: Performed by: NURSE ANESTHETIST, CERTIFIED REGISTERED

## 2024-01-17 PROCEDURE — 6360000002 HC RX W HCPCS: Performed by: INTERNAL MEDICINE

## 2024-01-17 PROCEDURE — 7100000000 HC PACU RECOVERY - FIRST 15 MIN: Performed by: ORTHOPAEDIC SURGERY

## 2024-01-17 PROCEDURE — 94761 N-INVAS EAR/PLS OXIMETRY MLT: CPT

## 2024-01-17 PROCEDURE — 7100000001 HC PACU RECOVERY - ADDTL 15 MIN: Performed by: ORTHOPAEDIC SURGERY

## 2024-01-17 PROCEDURE — 36415 COLL VENOUS BLD VENIPUNCTURE: CPT

## 2024-01-17 PROCEDURE — C1713 ANCHOR/SCREW BN/BN,TIS/BN: HCPCS | Performed by: ORTHOPAEDIC SURGERY

## 2024-01-17 PROCEDURE — 2720000010 HC SURG SUPPLY STERILE: Performed by: ORTHOPAEDIC SURGERY

## 2024-01-17 PROCEDURE — 6370000000 HC RX 637 (ALT 250 FOR IP): Performed by: INTERNAL MEDICINE

## 2024-01-17 PROCEDURE — 3600000004 HC SURGERY LEVEL 4 BASE: Performed by: ORTHOPAEDIC SURGERY

## 2024-01-17 PROCEDURE — 3700000000 HC ANESTHESIA ATTENDED CARE: Performed by: ORTHOPAEDIC SURGERY

## 2024-01-17 PROCEDURE — 3700000001 HC ADD 15 MINUTES (ANESTHESIA): Performed by: ORTHOPAEDIC SURGERY

## 2024-01-17 PROCEDURE — 6360000002 HC RX W HCPCS: Performed by: ORTHOPAEDIC SURGERY

## 2024-01-17 PROCEDURE — 2580000003 HC RX 258: Performed by: INTERNAL MEDICINE

## 2024-01-17 PROCEDURE — 80048 BASIC METABOLIC PNL TOTAL CA: CPT

## 2024-01-17 PROCEDURE — 2580000003 HC RX 258: Performed by: ORTHOPAEDIC SURGERY

## 2024-01-17 PROCEDURE — 2709999900 HC NON-CHARGEABLE SUPPLY: Performed by: ORTHOPAEDIC SURGERY

## 2024-01-17 PROCEDURE — 76000 FLUOROSCOPY <1 HR PHYS/QHP: CPT

## 2024-01-17 PROCEDURE — 85025 COMPLETE CBC W/AUTO DIFF WBC: CPT

## 2024-01-17 PROCEDURE — 2580000003 HC RX 258

## 2024-01-17 PROCEDURE — 0QSH06Z REPOSITION LEFT TIBIA WITH INTRAMEDULLARY INTERNAL FIXATION DEVICE, OPEN APPROACH: ICD-10-PCS | Performed by: ORTHOPAEDIC SURGERY

## 2024-01-17 PROCEDURE — 82306 VITAMIN D 25 HYDROXY: CPT

## 2024-01-17 PROCEDURE — 1200000000 HC SEMI PRIVATE

## 2024-01-17 DEVICE — SCREW BNE L36MM DIA5MM TIB LT GRN TI ST CANN LOK FULL THRD: Type: IMPLANTABLE DEVICE | Site: TIBIA | Status: FUNCTIONAL

## 2024-01-17 DEVICE — NAIL IM L330MM DIA10MM UNIV PROX TIB LT GRN TI BEND CANN: Type: IMPLANTABLE DEVICE | Site: TIBIA | Status: FUNCTIONAL

## 2024-01-17 DEVICE — SCREW BNE L40MM DIA5MM ST TIB LT GRN TI ST CANN LOK FULL: Type: IMPLANTABLE DEVICE | Site: TIBIA | Status: FUNCTIONAL

## 2024-01-17 DEVICE — SCREW BNE LCK 5X30 MM W/ T25 STARDRV FOR IM NAIL TI STRL: Type: IMPLANTABLE DEVICE | Site: TIBIA | Status: FUNCTIONAL

## 2024-01-17 RX ORDER — HYDRALAZINE HYDROCHLORIDE 20 MG/ML
10 INJECTION INTRAMUSCULAR; INTRAVENOUS
Status: DISCONTINUED | OUTPATIENT
Start: 2024-01-17 | End: 2024-01-17 | Stop reason: HOSPADM

## 2024-01-17 RX ORDER — 0.9 % SODIUM CHLORIDE 0.9 %
500 INTRAVENOUS SOLUTION INTRAVENOUS ONCE
Status: COMPLETED | OUTPATIENT
Start: 2024-01-17 | End: 2024-01-17

## 2024-01-17 RX ORDER — SODIUM CHLORIDE 0.9 % (FLUSH) 0.9 %
5-40 SYRINGE (ML) INJECTION PRN
Status: DISCONTINUED | OUTPATIENT
Start: 2024-01-17 | End: 2024-01-20 | Stop reason: HOSPADM

## 2024-01-17 RX ORDER — BUPIVACAINE HYDROCHLORIDE 5 MG/ML
INJECTION, SOLUTION EPIDURAL; INTRACAUDAL
Status: COMPLETED | OUTPATIENT
Start: 2024-01-17 | End: 2024-01-17

## 2024-01-17 RX ORDER — SODIUM CHLORIDE 0.9 % (FLUSH) 0.9 %
5-40 SYRINGE (ML) INJECTION EVERY 12 HOURS SCHEDULED
Status: DISCONTINUED | OUTPATIENT
Start: 2024-01-17 | End: 2024-01-20 | Stop reason: HOSPADM

## 2024-01-17 RX ORDER — SODIUM CHLORIDE, SODIUM LACTATE, POTASSIUM CHLORIDE, CALCIUM CHLORIDE 600; 310; 30; 20 MG/100ML; MG/100ML; MG/100ML; MG/100ML
INJECTION, SOLUTION INTRAVENOUS CONTINUOUS
Status: DISPENSED | OUTPATIENT
Start: 2024-01-17 | End: 2024-01-18

## 2024-01-17 RX ORDER — MAGNESIUM HYDROXIDE/ALUMINUM HYDROXICE/SIMETHICONE 120; 1200; 1200 MG/30ML; MG/30ML; MG/30ML
15 SUSPENSION ORAL EVERY 6 HOURS PRN
Status: DISCONTINUED | OUTPATIENT
Start: 2024-01-17 | End: 2024-01-20 | Stop reason: HOSPADM

## 2024-01-17 RX ORDER — ACETAMINOPHEN 325 MG/1
650 TABLET ORAL EVERY 4 HOURS PRN
Status: DISCONTINUED | OUTPATIENT
Start: 2024-01-17 | End: 2024-01-20 | Stop reason: HOSPADM

## 2024-01-17 RX ORDER — FENTANYL CITRATE 50 UG/ML
50 INJECTION, SOLUTION INTRAMUSCULAR; INTRAVENOUS EVERY 5 MIN PRN
Status: DISCONTINUED | OUTPATIENT
Start: 2024-01-17 | End: 2024-01-17 | Stop reason: HOSPADM

## 2024-01-17 RX ORDER — ONDANSETRON 2 MG/ML
INJECTION INTRAMUSCULAR; INTRAVENOUS PRN
Status: DISCONTINUED | OUTPATIENT
Start: 2024-01-17 | End: 2024-01-17 | Stop reason: SDUPTHER

## 2024-01-17 RX ORDER — ONDANSETRON 2 MG/ML
4 INJECTION INTRAMUSCULAR; INTRAVENOUS
Status: DISCONTINUED | OUTPATIENT
Start: 2024-01-17 | End: 2024-01-17 | Stop reason: HOSPADM

## 2024-01-17 RX ORDER — SODIUM CHLORIDE 0.9 % (FLUSH) 0.9 %
5-40 SYRINGE (ML) INJECTION EVERY 12 HOURS SCHEDULED
Status: DISCONTINUED | OUTPATIENT
Start: 2024-01-17 | End: 2024-01-17 | Stop reason: HOSPADM

## 2024-01-17 RX ORDER — DEXAMETHASONE SODIUM PHOSPHATE 4 MG/ML
INJECTION, SOLUTION INTRA-ARTICULAR; INTRALESIONAL; INTRAMUSCULAR; INTRAVENOUS; SOFT TISSUE PRN
Status: DISCONTINUED | OUTPATIENT
Start: 2024-01-17 | End: 2024-01-17 | Stop reason: SDUPTHER

## 2024-01-17 RX ORDER — SODIUM CHLORIDE, SODIUM LACTATE, POTASSIUM CHLORIDE, CALCIUM CHLORIDE 600; 310; 30; 20 MG/100ML; MG/100ML; MG/100ML; MG/100ML
INJECTION, SOLUTION INTRAVENOUS CONTINUOUS PRN
Status: DISCONTINUED | OUTPATIENT
Start: 2024-01-17 | End: 2024-01-17 | Stop reason: SDUPTHER

## 2024-01-17 RX ORDER — LIDOCAINE HYDROCHLORIDE 20 MG/ML
INJECTION, SOLUTION INTRAVENOUS PRN
Status: DISCONTINUED | OUTPATIENT
Start: 2024-01-17 | End: 2024-01-17 | Stop reason: SDUPTHER

## 2024-01-17 RX ORDER — OXYCODONE HYDROCHLORIDE AND ACETAMINOPHEN 5; 325 MG/1; MG/1
1 TABLET ORAL EVERY 4 HOURS PRN
Status: DISCONTINUED | OUTPATIENT
Start: 2024-01-17 | End: 2024-01-20 | Stop reason: HOSPADM

## 2024-01-17 RX ORDER — SODIUM CHLORIDE 9 MG/ML
INJECTION, SOLUTION INTRAVENOUS PRN
Status: DISCONTINUED | OUTPATIENT
Start: 2024-01-17 | End: 2024-01-17 | Stop reason: HOSPADM

## 2024-01-17 RX ORDER — FENTANYL CITRATE 50 UG/ML
INJECTION, SOLUTION INTRAMUSCULAR; INTRAVENOUS PRN
Status: DISCONTINUED | OUTPATIENT
Start: 2024-01-17 | End: 2024-01-17 | Stop reason: SDUPTHER

## 2024-01-17 RX ORDER — ROCURONIUM BROMIDE 10 MG/ML
INJECTION, SOLUTION INTRAVENOUS PRN
Status: DISCONTINUED | OUTPATIENT
Start: 2024-01-17 | End: 2024-01-17 | Stop reason: SDUPTHER

## 2024-01-17 RX ORDER — OXYCODONE HYDROCHLORIDE AND ACETAMINOPHEN 5; 325 MG/1; MG/1
2 TABLET ORAL EVERY 4 HOURS PRN
Status: DISCONTINUED | OUTPATIENT
Start: 2024-01-17 | End: 2024-01-20 | Stop reason: HOSPADM

## 2024-01-17 RX ORDER — SODIUM CHLORIDE 9 MG/ML
INJECTION, SOLUTION INTRAVENOUS PRN
Status: DISCONTINUED | OUTPATIENT
Start: 2024-01-17 | End: 2024-01-20 | Stop reason: HOSPADM

## 2024-01-17 RX ORDER — SODIUM CHLORIDE 0.9 % (FLUSH) 0.9 %
5-40 SYRINGE (ML) INJECTION PRN
Status: DISCONTINUED | OUTPATIENT
Start: 2024-01-17 | End: 2024-01-17 | Stop reason: HOSPADM

## 2024-01-17 RX ORDER — PROPOFOL 10 MG/ML
INJECTION, EMULSION INTRAVENOUS PRN
Status: DISCONTINUED | OUTPATIENT
Start: 2024-01-17 | End: 2024-01-17 | Stop reason: SDUPTHER

## 2024-01-17 RX ORDER — ENOXAPARIN SODIUM 100 MG/ML
40 INJECTION SUBCUTANEOUS DAILY
Status: DISCONTINUED | OUTPATIENT
Start: 2024-01-18 | End: 2024-01-20 | Stop reason: HOSPADM

## 2024-01-17 RX ORDER — LABETALOL HYDROCHLORIDE 5 MG/ML
10 INJECTION, SOLUTION INTRAVENOUS
Status: DISCONTINUED | OUTPATIENT
Start: 2024-01-17 | End: 2024-01-17 | Stop reason: HOSPADM

## 2024-01-17 RX ORDER — FENTANYL CITRATE 50 UG/ML
25 INJECTION, SOLUTION INTRAMUSCULAR; INTRAVENOUS EVERY 5 MIN PRN
Status: DISCONTINUED | OUTPATIENT
Start: 2024-01-17 | End: 2024-01-17 | Stop reason: HOSPADM

## 2024-01-17 RX ADMIN — HYDROCODONE BITARTRATE AND ACETAMINOPHEN 1 TABLET: 5; 325 TABLET ORAL at 09:35

## 2024-01-17 RX ADMIN — LIDOCAINE HYDROCHLORIDE 80 MG: 20 INJECTION, SOLUTION INTRAVENOUS at 17:12

## 2024-01-17 RX ADMIN — SODIUM CHLORIDE, SODIUM LACTATE, POTASSIUM CHLORIDE, CALCIUM CHLORIDE: 600; 310; 30; 20 INJECTION, SOLUTION INTRAVENOUS at 19:06

## 2024-01-17 RX ADMIN — DEXAMETHASONE SODIUM PHOSPHATE 4 MG: 4 INJECTION, SOLUTION INTRAMUSCULAR; INTRAVENOUS at 17:21

## 2024-01-17 RX ADMIN — CARIPRAZINE 1.5 MG: 1.5 CAPSULE, GELATIN COATED ORAL at 09:35

## 2024-01-17 RX ADMIN — PROPOFOL 150 MG: 10 INJECTION, EMULSION INTRAVENOUS at 17:12

## 2024-01-17 RX ADMIN — SODIUM CHLORIDE, POTASSIUM CHLORIDE, SODIUM LACTATE AND CALCIUM CHLORIDE: 600; 310; 30; 20 INJECTION, SOLUTION INTRAVENOUS at 04:01

## 2024-01-17 RX ADMIN — KETOROLAC TROMETHAMINE 30 MG: 30 INJECTION, SOLUTION INTRAMUSCULAR; INTRAVENOUS at 20:04

## 2024-01-17 RX ADMIN — ROCURONIUM BROMIDE 50 MG: 10 INJECTION, SOLUTION INTRAVENOUS at 17:13

## 2024-01-17 RX ADMIN — HYDROMORPHONE HYDROCHLORIDE 0.5 MG: 1 INJECTION, SOLUTION INTRAMUSCULAR; INTRAVENOUS; SUBCUTANEOUS at 17:38

## 2024-01-17 RX ADMIN — KETOROLAC TROMETHAMINE 30 MG: 30 INJECTION, SOLUTION INTRAMUSCULAR; INTRAVENOUS at 00:08

## 2024-01-17 RX ADMIN — SUGAMMADEX 200 MG: 100 INJECTION, SOLUTION INTRAVENOUS at 18:17

## 2024-01-17 RX ADMIN — FENTANYL CITRATE 100 MCG: 50 INJECTION, SOLUTION INTRAMUSCULAR; INTRAVENOUS at 17:07

## 2024-01-17 RX ADMIN — KETOROLAC TROMETHAMINE 30 MG: 30 INJECTION, SOLUTION INTRAMUSCULAR; INTRAVENOUS at 12:11

## 2024-01-17 RX ADMIN — HYDROMORPHONE HYDROCHLORIDE 0.5 MG: 1 INJECTION, SOLUTION INTRAMUSCULAR; INTRAVENOUS; SUBCUTANEOUS at 18:27

## 2024-01-17 RX ADMIN — SODIUM CHLORIDE 500 ML: 9 INJECTION, SOLUTION INTRAVENOUS at 05:57

## 2024-01-17 RX ADMIN — VILAZODONE HYDROCHLORIDE 20 MG: 10 TABLET, FILM COATED ORAL at 09:35

## 2024-01-17 RX ADMIN — KETOROLAC TROMETHAMINE 30 MG: 30 INJECTION, SOLUTION INTRAMUSCULAR; INTRAVENOUS at 06:46

## 2024-01-17 RX ADMIN — HYDROCODONE BITARTRATE AND ACETAMINOPHEN 1 TABLET: 5; 325 TABLET ORAL at 04:45

## 2024-01-17 RX ADMIN — CEFAZOLIN 2000 MG: 2 INJECTION, POWDER, FOR SOLUTION INTRAMUSCULAR; INTRAVENOUS at 17:19

## 2024-01-17 RX ADMIN — SODIUM CHLORIDE, SODIUM LACTATE, POTASSIUM CHLORIDE, CALCIUM CHLORIDE: 600; 310; 30; 20 INJECTION, SOLUTION INTRAVENOUS at 17:04

## 2024-01-17 RX ADMIN — ONDANSETRON 4 MG: 2 INJECTION INTRAMUSCULAR; INTRAVENOUS at 17:21

## 2024-01-17 RX ADMIN — CYCLOBENZAPRINE 10 MG: 10 TABLET, FILM COATED ORAL at 11:33

## 2024-01-17 RX ADMIN — LEVOTHYROXINE SODIUM 150 MCG: 0.07 TABLET ORAL at 05:39

## 2024-01-17 ASSESSMENT — PAIN DESCRIPTION - DESCRIPTORS
DESCRIPTORS: ACHING;BURNING;SHARP;THROBBING
DESCRIPTORS: ACHING;SORE
DESCRIPTORS: SHARP;BURNING
DESCRIPTORS: THROBBING;BURNING
DESCRIPTORS: ACHING;SORE

## 2024-01-17 ASSESSMENT — PAIN - FUNCTIONAL ASSESSMENT
PAIN_FUNCTIONAL_ASSESSMENT: PREVENTS OR INTERFERES WITH ALL ACTIVE AND SOME PASSIVE ACTIVITIES
PAIN_FUNCTIONAL_ASSESSMENT: PREVENTS OR INTERFERES WITH ALL ACTIVE AND SOME PASSIVE ACTIVITIES
PAIN_FUNCTIONAL_ASSESSMENT: 0-10
PAIN_FUNCTIONAL_ASSESSMENT: PREVENTS OR INTERFERES SOME ACTIVE ACTIVITIES AND ADLS
PAIN_FUNCTIONAL_ASSESSMENT: ACTIVITIES ARE NOT PREVENTED

## 2024-01-17 ASSESSMENT — PAIN DESCRIPTION - ORIENTATION
ORIENTATION: LEFT

## 2024-01-17 ASSESSMENT — PAIN DESCRIPTION - LOCATION
LOCATION: LEG
LOCATION: ANKLE;LEG
LOCATION: ANKLE;LEG
LOCATION: LEG
LOCATION: KNEE
LOCATION: KNEE
LOCATION: HIP;LEG

## 2024-01-17 ASSESSMENT — PAIN SCALES - WONG BAKER
WONGBAKER_NUMERICALRESPONSE: 2

## 2024-01-17 ASSESSMENT — PAIN SCALES - GENERAL
PAINLEVEL_OUTOF10: 8
PAINLEVEL_OUTOF10: 7
PAINLEVEL_OUTOF10: 8
PAINLEVEL_OUTOF10: 2
PAINLEVEL_OUTOF10: 2
PAINLEVEL_OUTOF10: 7
PAINLEVEL_OUTOF10: 7
PAINLEVEL_OUTOF10: 3
PAINLEVEL_OUTOF10: 4
PAINLEVEL_OUTOF10: 8
PAINLEVEL_OUTOF10: 3
PAINLEVEL_OUTOF10: 2
PAINLEVEL_OUTOF10: 9

## 2024-01-17 NOTE — CONSULTS
Orthopaedic Consult    Patient Name: Suleiman Mcghee   (1970)  MRN   [id number]   Today's date:  1/17/2024     CHIEF COMPLAINT: Left leg pain    HISTORY OF PRESENT ILLNESS:      The patient is a 53 y.o. female  who presents with left leg pain and instability after a mechanical fall on the ice yesterday.  She felt a pop and had immediate pain and instability at the left leg with inability to bear weight.  Pain was 10 out of 10 and she was seen in the emergency room.  X-rays were taken and she was found to have a displaced tibial shaft fracture and proximal fibula fracture.    She was admitted to the hospitalist service and I was consulted for surgical intervention.    No history of prior injuries or problems with her left leg.  Pain is made better with rest and immobilization and worse with any attempted movement of the left leg.      Past Medical History         Diagnosis Date    Anesthesia     \"have trouble with my blood pressure going really low after surgery\"    Anxiety     Cough     Occ. Nonproductive Cough    Difficulty swallowing     \"Due To The Thyroid\"    History of echocardiogram 10/05/2020    EF 55-60%, E/A reversal, indeterminate diastolic function, no significant valvular abnormalities, no pericardial effusion     History of exercise stress test 10/05/2020    Treadmill, Normal exercise performance without angina and ischemic EKG changes. Has baseline EKG changes    HX OTHER MEDICAL     \"have to watch, limited ROM of neck since neck surgery\"    Migraine Last Migraine 3-31-16    Shortness of breath     Sinus drainage     Teeth missing     Upper And Lower    Thyroid disease     thyroid nodules\"have had two previous biopsies in the past\"    Wears glasses     [place] teeth extracted     4 [place] Teeth Extracted In Past       Past Surgical History         Procedure Laterality Date    BACK SURGERY  12-14    ACF \"C6, C7\" With Hardware    BREAST ENHANCEMENT SURGERY  somewhat limits evaluation.  No discrete fracture of the forefoot is identified.  Mild diffuse soft tissue swelling.     1. Acute displaced distal tibial shaft fracture. 2. Acute nondisplaced comminuted proximal fibular head fracture. 3. Osseous fragments at the posterior margin of the calcaneus could represent an acute avulsion fracture versus fragmented enthesophyte.  Recommend point tenderness. 4. Mild diffuse soft tissue swelling.     XR KNEE LEFT (3 VIEWS)    Result Date: 1/16/2024  EXAMINATION: TWO XRAY VIEWS OF THE LEFT TIBIA AND FIBULA; THREE XRAY VIEWS OF THE LEFT KNEE; THREE XRAY VIEWS OF THE LEFT ANKLE; THREE XRAY VIEWS OF THE LEFT FOOT 1/16/2024 11:27 am COMPARISON: None HISTORY: ORDERING SYSTEM PROVIDED HISTORY: fall, deformity TECHNOLOGIST PROVIDED HISTORY: Reason for exam:->fall, deformity Reason for Exam: pain and deformity after fall FINDINGS: Left knee: The tricompartmental joint spaces appear maintained.  No discrete joint effusion.  The distal femur and patella appear intact. Left tibia and fibula: Acute displaced spiral fracture of the distal tibial shaft.  There is focal anterior and lateral soft tissue swelling.  Acute nondisplaced comminuted fracture of the proximal fibula metaphysis extending to the proximal tibiofibular joint. Left ankle and left foot: Osseous fragments adjacent to the calcaneus could represent avulsion fracture of the Achilles versus fragmented enthesophyte. The tibial plafond and talar dome appear intact.  Suboptimal oblique view somewhat limits evaluation.  Overlying artifact obscures the fine anatomic detail.  The tarsometatarsal alignment appears grossly maintained, although suboptimal patient positioning somewhat limits evaluation.  No discrete fracture of the forefoot is identified.  Mild diffuse soft tissue swelling.     1. Acute displaced distal tibial shaft fracture. 2. Acute nondisplaced comminuted proximal fibular head fracture. 3. Osseous fragments at the

## 2024-01-17 NOTE — PLAN OF CARE
Problem: Skin/Tissue Integrity  Goal: Absence of new skin breakdown  Description: 1.  Monitor for areas of redness and/or skin breakdown  2.  Assess vascular access sites hourly  3.  Every 4-6 hours minimum:  Change oxygen saturation probe site  4.  Every 4-6 hours:  If on nasal continuous positive airway pressure, respiratory therapy assess nares and determine need for appliance change or resting period.  Outcome: Progressing     Problem: Safety - Adult  Goal: Free from fall injury  Outcome: Progressing     Problem: ABCDS Injury Assessment  Goal: Absence of physical injury  Outcome: Progressing     Problem: Discharge Planning  Goal: Discharge to home or other facility with appropriate resources  Outcome: Progressing     Problem: Pain  Goal: Verbalizes/displays adequate comfort level or baseline comfort level  Outcome: Progressing

## 2024-01-17 NOTE — OP NOTE
Operative Note      Patient: Suleiman Mcghee  YOB: 1970  MRN: [id number]    Date of Procedure: 1/17/2024      Preoperative diagnosis:  1.  Left displaced oblique tibia shaft fracture    2.  Left proximal fibula fracture nondisplaced    Post-Op Diagnosis: Same    Procedure:  1.  Open reduction and internal fixation of tibia shaft fracture with intramedullary nail    2.  Closed management proximal fibula fracture    Surgeon(s):  Rodney Sage MD    Assistant:   * No surgical staff found *    Anesthesia: General    Estimated Blood Loss (mL): less than 100     Complications: None    Specimens:   * No specimens in log *    Implants:  Implant Name Type Inv. Item Serial No.  Lot No. LRB No. Used Action   NAIL IM [id number] [id number] UNIV PROX TIB LT GRN TI BEND JANET - [id number]  NAIL IM L330MM [id number] UNIV PROX TIB LT GRN TI BEND JANET  DEPUY SYNTHES [employer]-WD [id number] Left 1 Implanted   SCREW BNE L40MM DIA5MM ST TIB LT GRN TI ST JANET SYDNEY FULL - [id number]  SCREW BNE L40MM DIA5MM ST TIB LT GRN TI ST JANET SYDNEY FULL  DEPUY SYNTHES [employer]-WD [id number] Left 1 Implanted   SCREW BNE L36MM DIA5MM TIB LT GRN TI ST JANET SYDNEY FULL THRD - [id number]  SCREW BNE L36MM DIA5MM TIB LT GRN TI ST JANET SYDNEY FULL THRD  DEPUY SYNTHES [employer]-WD [id number] Left 1 Implanted   SCREW BNE L40MM DIA5MM ST TIB LT GRN TI ST JANET SYDNEY FULL - [id number]  SCREW BNE L40MM DIA5MM ST TIB LT GRN TI ST JANET SYDNEY FULL  DEPUY SYNTHES [employer]-WD [id number] Left 1 Implanted   SCREW BNE LCK 5X30 MM W/ T25 STARDRV FOR IM NAIL TI STRL - [id number]  SCREW BNE LCK 5X30 MM W/ T25 STARDRV FOR IM NAIL TI STRL  DEPUY SYNTHES [employer]-WD [id number] Left 1 Implanted         Drains: * No LDAs found *    Findings:         Detailed Description of Procedure:   The patient was identified in the preoperative area and the site was marked.  She was taken back to the operating room and placed supine on the table.  After induction of general endotracheal anesthesia, the left lower extremity was

## 2024-01-17 NOTE — PROGRESS NOTES
V2.0  [facility] Hospitalist Progress Note      Name:  Suleiman Mcghee /Age/Sex: 1970  (53 y.o. female)   MRN & CSN:  [id number] & [id number] Encounter Date/Time: 2024 1:48 PM EST    Location:  [address] [place] [address] PCP: Jayy Diana MD       Hospital Day: 2      Subjective:     Chief Complaint:  Fall (Patient slipped on the ice) and Leg Injury (L leg. Obvious deformity given 100mcg of fentanyl in route)     Pt waiting on surgery planned today. Pain is under control. Has headache states it is chronic. Normally controlled wit tylenol. No other complaints or concerns.        Assessment and Plan:     Acute displaced distal tibial shaft fracture, Acute nondisplaced comminuted proximal fibular  2/2 mechanical fall  Concern for osteoporosis  slipped on ice while on a walk  morning  -has had prior fractures in the past  -Patient's mother did have osteoporosis  -Ortho consulted by ED  - Ortho on consult, plan for open reduction and interval fixation today  Morphine IV and norco po for pain control     Hypothyroidism  Continue levothyroxine       Personally reviewed Lab Studies and Imaging     Drugs that require monitoring for toxicity include lovenox and the method of monitoring was cbc      Diet Diet NPO Exceptions are: Ice Chips, Sips of Water with Meds   DVT Prophylaxis [x] Lovenox, []  Heparin, [] SCDs, [] Ambulation,  [] Eliquis, [] Xarelto  [] Coumadin   Code Status Full Code   Disposition From: home  Expected Disposition: TBD  Estimated Date of Discharge:  1-2 days  Patient requires continued admission due to surgery planned today   Surrogate Decision Maker/ POA      Review of Systems:    Review of Systems    See above    Objective:     Intake/Output Summary (Last 24 hours) at 2024 1416  Last data filed at 2024 1755  Gross per 24 hour   Intake 240 ml   Output --   Net 240 ml        Vitals:   Vitals:    24 0935   BP:    Pulse:    Resp: 16   Temp:    SpO2:        Physical Exam:  displaced distal tibial shaft fracture. 2. Acute nondisplaced comminuted proximal fibular head fracture. 3. Osseous fragments at the posterior margin of the calcaneus could represent an acute avulsion fracture versus fragmented enthesophyte.  Recommend point tenderness. 4. Mild diffuse soft tissue swelling.     XR ANKLE LEFT (MIN 3 VIEWS)    Result Date: 1/16/2024  EXAMINATION: TWO XRAY VIEWS OF THE LEFT TIBIA AND FIBULA; THREE XRAY VIEWS OF THE LEFT KNEE; THREE XRAY VIEWS OF THE LEFT ANKLE; THREE XRAY VIEWS OF THE LEFT FOOT 1/16/2024 11:27 am COMPARISON: None HISTORY: ORDERING SYSTEM PROVIDED HISTORY: fall, deformity TECHNOLOGIST PROVIDED HISTORY: Reason for exam:->fall, deformity Reason for Exam: pain and deformity after fall FINDINGS: Left knee: The tricompartmental joint spaces appear maintained.  No discrete joint effusion.  The distal femur and patella appear intact. Left tibia and fibula: Acute displaced spiral fracture of the distal tibial shaft.  There is focal anterior and lateral soft tissue swelling.  Acute nondisplaced comminuted fracture of the proximal fibula metaphysis extending to the proximal tibiofibular joint. Left ankle and left foot: Osseous fragments adjacent to the calcaneus could represent avulsion fracture of the Achilles versus fragmented enthesophyte. The tibial plafond and talar dome appear intact.  Suboptimal oblique view somewhat limits evaluation.  Overlying artifact obscures the fine anatomic detail.  The tarsometatarsal alignment appears grossly maintained, although suboptimal patient positioning somewhat limits evaluation.  No discrete fracture of the forefoot is identified.  Mild diffuse soft tissue swelling.     1. Acute displaced distal tibial shaft fracture. 2. Acute nondisplaced comminuted proximal fibular head fracture. 3. Osseous fragments at the posterior margin of the calcaneus could represent an acute avulsion fracture versus fragmented enthesophyte.  Recommend point

## 2024-01-17 NOTE — ANESTHESIA POSTPROCEDURE EVALUATION
Department of Anesthesiology  Postprocedure Note    Patient: Suleiman Mcghee  MRN: [id number]  YOB: 1970  Date of evaluation: 1/17/2024    Procedure Summary       Date: 01/17/24 Room / Location: [address]    Anesthesia Start: 1704 Anesthesia Stop: 1849    Procedure: LEFT TIBIA IM NAIL INSERTION (Left: Leg Lower) Diagnosis:       Closed fracture of proximal end of left tibia, unspecified fracture morphology, initial encounter      (Closed fracture of proximal end of left tibia, unspecified fracture morphology, initial encounter [S82.102A])    Surgeons: Rodney Sage MD Responsible Provider: Diogenes Falcon MD    Anesthesia Type: general ASA Status: 2            Anesthesia Type: No value filed.    Shaunna Phase I:      Shaunna Phase II:      Anesthesia Post Evaluation    Patient location during evaluation: PACU  Patient participation: complete - patient participated  Level of consciousness: awake and sleepy but conscious  Pain score: 2  Airway patency: patent  Nausea & Vomiting: no nausea and no vomiting  Cardiovascular status: hemodynamically stable  Respiratory status: acceptable  Hydration status: euvolemic  Pain management: adequate    No notable events documented.

## 2024-01-17 NOTE — CARE COORDINATION
01/17/24 1314   Service Assessment   Patient Orientation Alert and Oriented   Cognition Alert   History Provided By Medical Record   Primary Caregiver Self   Support Systems Family Members   Patient's Healthcare Decision Maker is: Legal Next of Kin   PCP Verified by CM Yes   Last Visit to PCP Within last year   Prior Functional Level Independent in ADLs/IADLs   Current Functional Level Assistance with the following:;Bathing;Toileting;Mobility   Can patient return to prior living arrangement Unknown at present   Ability to make needs known: Good   Family able to assist with home care needs: Yes   Would you like for me to discuss the discharge plan with any other family members/significant others, and if so, who? No   Financial Resources Other (Comment)   Community Resources None     From home, independent prior to injury. OR planned for today. Will need PT/OT evals post surgery. CM following.

## 2024-01-17 NOTE — PROGRESS NOTES
1847: Arrived to PACU from OR. Monitors applied, alarms on. Report obtained from Pau BRANDON and Gertrude CONNER. Left lower leg elevated on pillow, FOB up. Able to wiggle toes, states sensation is normal. Denies pain.  1937: Transported to room 1128.

## 2024-01-18 LAB
ANION GAP SERPL CALCULATED.3IONS-SCNC: 10 MMOL/L (ref 7–16)
BASOPHILS ABSOLUTE: 0 K/CU MM
BASOPHILS RELATIVE PERCENT: 0.2 % (ref 0–1)
BUN SERPL-MCNC: 13 MG/DL (ref 6–23)
CALCIUM SERPL-MCNC: 8.2 MG/DL (ref 8.3–10.6)
CHLORIDE BLD-SCNC: 106 MMOL/L (ref 99–110)
CO2: 25 MMOL/L (ref 21–32)
CREAT SERPL-MCNC: 0.9 MG/DL (ref 0.6–1.1)
DIFFERENTIAL TYPE: ABNORMAL
EOSINOPHILS ABSOLUTE: 0 K/CU MM
EOSINOPHILS RELATIVE PERCENT: 0.4 % (ref 0–3)
GFR SERPL CREATININE-BSD FRML MDRD: >60 ML/MIN/1.73M2
GLUCOSE SERPL-MCNC: 143 MG/DL (ref 70–99)
HCT VFR BLD CALC: 35.3 % (ref 37–47)
HEMOGLOBIN: 11.7 GM/DL (ref 12.5–16)
IMMATURE NEUTROPHIL %: 0.6 % (ref 0–0.43)
LYMPHOCYTES ABSOLUTE: 1.3 K/CU MM
LYMPHOCYTES RELATIVE PERCENT: 14.8 % (ref 24–44)
MCH RBC QN AUTO: 29.8 PG (ref 27–31)
MCHC RBC AUTO-ENTMCNC: 33.1 % (ref 32–36)
MCV RBC AUTO: 90.1 FL (ref 78–100)
MONOCYTES ABSOLUTE: 0.5 K/CU MM
MONOCYTES RELATIVE PERCENT: 5.7 % (ref 0–4)
NUCLEATED RBC %: 0 %
PDW BLD-RTO: 13.1 % (ref 11.7–14.9)
PLATELET # BLD: 235 K/CU MM (ref 140–440)
PMV BLD AUTO: 9.5 FL (ref 7.5–11.1)
POTASSIUM SERPL-SCNC: 3.9 MMOL/L (ref 3.5–5.1)
RBC # BLD: 3.92 M/CU MM (ref 4.2–5.4)
SEGMENTED NEUTROPHILS ABSOLUTE COUNT: 7.1 K/CU MM
SEGMENTED NEUTROPHILS RELATIVE PERCENT: 78.3 % (ref 36–66)
SODIUM BLD-SCNC: 141 MMOL/L (ref 135–145)
TOTAL IMMATURE NEUTOROPHIL: 0.05 K/CU MM
TOTAL NUCLEATED RBC: 0 K/CU MM
WBC # BLD: 9.1 K/CU MM (ref 4–10.5)

## 2024-01-18 PROCEDURE — 94761 N-INVAS EAR/PLS OXIMETRY MLT: CPT

## 2024-01-18 PROCEDURE — 94150 VITAL CAPACITY TEST: CPT

## 2024-01-18 PROCEDURE — 6360000002 HC RX W HCPCS: Performed by: ORTHOPAEDIC SURGERY

## 2024-01-18 PROCEDURE — 1200000000 HC SEMI PRIVATE

## 2024-01-18 PROCEDURE — 80048 BASIC METABOLIC PNL TOTAL CA: CPT

## 2024-01-18 PROCEDURE — 36415 COLL VENOUS BLD VENIPUNCTURE: CPT

## 2024-01-18 PROCEDURE — 6370000000 HC RX 637 (ALT 250 FOR IP): Performed by: ORTHOPAEDIC SURGERY

## 2024-01-18 PROCEDURE — 2580000003 HC RX 258: Performed by: ORTHOPAEDIC SURGERY

## 2024-01-18 PROCEDURE — 97162 PT EVAL MOD COMPLEX 30 MIN: CPT

## 2024-01-18 PROCEDURE — 85025 COMPLETE CBC W/AUTO DIFF WBC: CPT

## 2024-01-18 PROCEDURE — 97116 GAIT TRAINING THERAPY: CPT

## 2024-01-18 PROCEDURE — 2580000003 HC RX 258: Performed by: STUDENT IN AN ORGANIZED HEALTH CARE EDUCATION/TRAINING PROGRAM

## 2024-01-18 RX ORDER — SODIUM CHLORIDE, SODIUM LACTATE, POTASSIUM CHLORIDE, CALCIUM CHLORIDE 600; 310; 30; 20 MG/100ML; MG/100ML; MG/100ML; MG/100ML
INJECTION, SOLUTION INTRAVENOUS CONTINUOUS
Status: DISPENSED | OUTPATIENT
Start: 2024-01-18 | End: 2024-01-18

## 2024-01-18 RX ADMIN — SODIUM CHLORIDE, PRESERVATIVE FREE 10 ML: 5 INJECTION INTRAVENOUS at 09:22

## 2024-01-18 RX ADMIN — VILAZODONE HYDROCHLORIDE 20 MG: 10 TABLET, FILM COATED ORAL at 09:22

## 2024-01-18 RX ADMIN — CEFAZOLIN 2000 MG: 2 INJECTION, POWDER, FOR SOLUTION INTRAMUSCULAR; INTRAVENOUS at 09:15

## 2024-01-18 RX ADMIN — SODIUM CHLORIDE, POTASSIUM CHLORIDE, SODIUM LACTATE AND CALCIUM CHLORIDE: 600; 310; 30; 20 INJECTION, SOLUTION INTRAVENOUS at 09:11

## 2024-01-18 RX ADMIN — OXYCODONE AND ACETAMINOPHEN 2 TABLET: 5; 325 TABLET ORAL at 18:20

## 2024-01-18 RX ADMIN — ROSUVASTATIN CALCIUM 40 MG: 20 TABLET, FILM COATED ORAL at 18:13

## 2024-01-18 RX ADMIN — HYDROCODONE BITARTRATE AND ACETAMINOPHEN 1 TABLET: 5; 325 TABLET ORAL at 06:09

## 2024-01-18 RX ADMIN — LEVOTHYROXINE SODIUM 150 MCG: 0.07 TABLET ORAL at 06:10

## 2024-01-18 RX ADMIN — HYDROMORPHONE HYDROCHLORIDE 0.5 MG: 1 INJECTION, SOLUTION INTRAMUSCULAR; INTRAVENOUS; SUBCUTANEOUS at 21:45

## 2024-01-18 RX ADMIN — ENOXAPARIN SODIUM 40 MG: 100 INJECTION SUBCUTANEOUS at 09:23

## 2024-01-18 RX ADMIN — HYDROCODONE BITARTRATE AND ACETAMINOPHEN 1 TABLET: 5; 325 TABLET ORAL at 10:47

## 2024-01-18 RX ADMIN — SODIUM CHLORIDE, POTASSIUM CHLORIDE, SODIUM LACTATE AND CALCIUM CHLORIDE: 600; 310; 30; 20 INJECTION, SOLUTION INTRAVENOUS at 19:36

## 2024-01-18 RX ADMIN — SODIUM CHLORIDE: 9 INJECTION, SOLUTION INTRAVENOUS at 09:14

## 2024-01-18 RX ADMIN — CEFAZOLIN 2000 MG: 2 INJECTION, POWDER, FOR SOLUTION INTRAMUSCULAR; INTRAVENOUS at 00:18

## 2024-01-18 RX ADMIN — CARIPRAZINE 1.5 MG: 1.5 CAPSULE, GELATIN COATED ORAL at 09:22

## 2024-01-18 ASSESSMENT — PAIN DESCRIPTION - LOCATION
LOCATION: LEG

## 2024-01-18 ASSESSMENT — PAIN DESCRIPTION - FREQUENCY
FREQUENCY: CONTINUOUS

## 2024-01-18 ASSESSMENT — PAIN - FUNCTIONAL ASSESSMENT
PAIN_FUNCTIONAL_ASSESSMENT: ACTIVITIES ARE NOT PREVENTED
PAIN_FUNCTIONAL_ASSESSMENT: PREVENTS OR INTERFERES SOME ACTIVE ACTIVITIES AND ADLS
PAIN_FUNCTIONAL_ASSESSMENT: ACTIVITIES ARE NOT PREVENTED

## 2024-01-18 ASSESSMENT — PAIN DESCRIPTION - ORIENTATION
ORIENTATION: LEFT

## 2024-01-18 ASSESSMENT — PAIN DESCRIPTION - DESCRIPTORS
DESCRIPTORS: ACHING;DISCOMFORT;CRAMPING
DESCRIPTORS: BURNING;STABBING
DESCRIPTORS: ACHING;SORE
DESCRIPTORS: BURNING;THROBBING;ACHING

## 2024-01-18 ASSESSMENT — PAIN DESCRIPTION - ONSET
ONSET: ON-GOING

## 2024-01-18 ASSESSMENT — PAIN SCALES - GENERAL
PAINLEVEL_OUTOF10: 2
PAINLEVEL_OUTOF10: 5
PAINLEVEL_OUTOF10: 0
PAINLEVEL_OUTOF10: 8
PAINLEVEL_OUTOF10: 10
PAINLEVEL_OUTOF10: 4
PAINLEVEL_OUTOF10: 6
PAINLEVEL_OUTOF10: 10

## 2024-01-18 ASSESSMENT — PAIN DESCRIPTION - PAIN TYPE
TYPE: ACUTE PAIN;SURGICAL PAIN
TYPE: SURGICAL PAIN
TYPE: SURGICAL PAIN

## 2024-01-18 ASSESSMENT — PAIN SCALES - WONG BAKER
WONGBAKER_NUMERICALRESPONSE: 6
WONGBAKER_NUMERICALRESPONSE: 2

## 2024-01-18 NOTE — PROGRESS NOTES
01/18/24 1347   Encounter Summary   Encounter Overview/Reason  Initial Encounter   Service Provided For: Patient   Referral/Consult From: [facility]   Support System Spouse   Last Encounter  01/18/24  (PT expressed feeling better today, She appreciates the care she is receiving, she is confident she will get well, she was comfortable and coping well and she has support with family.)   Complexity of Encounter Low   Begin Time 1340   End Time  1349   Total Time Calculated 9 min   Grief, Loss, and Adjustments   Type Adjustment to illness   Assessment/Intervention/Outcome   Assessment Calm;Coping;Hopeful   Intervention Active listening;Discussed illness injury and it’s impact;Explored/Affirmed feelings, thoughts, concerns;Sustaining Presence/Ministry of presence   Outcome Comfort;Coping;Expressed feelings, needs, and concerns;Expressed Gratitude   Plan and Referrals   Plan/Referrals Continue Support (comment)

## 2024-01-18 NOTE — CONSULTS
[facility] ACUTE CARE PHYSICAL THERAPY EVALUATION  Suleiman Mcghee, 1970, 1128/1128-A, 1/18/2024    History  [place]:  The primary encounter diagnosis was Closed displaced comminuted fracture of shaft of left tibia, initial encounter. Diagnoses of Closed fracture of head of left fibula, initial encounter and Fall, initial encounter were also pertinent to this visit.  Patient  has a past medical history of Anesthesia, Anxiety, Cough, Difficulty swallowing, History of echocardiogram, History of exercise stress test, HX OTHER MEDICAL, Migraine, Shortness of breath, Sinus drainage, Teeth missing, Thyroid disease, Wears glasses, and [place] teeth extracted.  Patient  has a past surgical history that includes Tubal ligation (1993); [place] tooth extraction; Thyroid surgery (3-30-16 Fine Needle Aspiration Biopsy); Dental surgery; back surgery (12-14); Endoscopy, colon, diagnostic; Breast enhancement surgery (Bilateral, 2000's); Thyroidectomy, Completion (N/A, 04/12/2016); and Tibia fracture surgery (Left, 1/17/2024).    Discharge Recommendation: [facility]    Subjective:    Patient states:  Pt agreeable to session      Pain:  6/10 pain in L LE at fx site.      Communication with other providers:  Handoff to RN    Restrictions: NWB L LE, general precautions, fall risk    Home Setup/Prior level of function  Social/Functional History  Lives With: Alone  Type of Home: House  Home Layout: One level  Home Access: Stairs to enter with rails  Entrance Stairs - Number of Steps: 4  Bathroom Shower/Tub: Tub/Shower unit  Home Equipment:  (Does not use AD at baseline)  ADL Assistance: Independent  Homemaking Assistance: Independent  Homemaking Responsibilities: Yes  Ambulation Assistance: Independent  Transfer Assistance: Independent  Active : Yes  **pt will be discharging to ex-husbands house with a one floor layout    Examination of body systems (includes body structures/functions, activity/participation

## 2024-01-18 NOTE — PROGRESS NOTES
Suleiman Mcghee (1970)    Daily Progress Note-  Rodney Sage MD, MD                     Today's Date:   1/18/2024          Subjective:     Doing well postoperatively.    Pain controlled    Objective:   Patient Vitals for the past 4 hrs:   BP Temp Temp src Pulse Resp SpO2   01/18/24 0609 -- -- -- -- 16 --   01/18/24 0600 (!) 92/48 97.9 °F (36.6 °C) Oral 64 16 98 %         Physical Exam:     The patient is awake and alert  Resting comfortably in bed    Operative extremity:    The dressing is clean dry and intact.  Compartments are soft and compressible.  Sensation and motor function intact distally      LABS   CBC:   Recent Labs     01/16/24  1335 01/17/24  0228   WBC 11.7* 8.2   HGB 14.0 12.9    270           Assessment and Plan     1.  POD # 1 left tibia IMN    1:  Physical therapy consult for mobilization   -NWB LLE  2:  DVT prophylaxis   -lovenox while in hospital, discharge on aspirin BID  3:  Continue Pain Control   -wean to PO meds  4.  Medical management per hospitalist service  5:  D/C Planning:     -likely home tomorrow after PT. Plan dressing change at knee tomorrow         Rodney Sage MD

## 2024-01-18 NOTE — PROGRESS NOTES
V2.0  [facility] Hospitalist Progress Note      Name:  Suleiman Mcghee /Age/Sex: 1970  (53 y.o. female)   MRN & CSN:  [id number] & [id number] Encounter Date/Time: 2024 1:48 PM EST    Location:  [address] [place] [address] PCP: Jayy Diana MD       Hospital Day: 3      Subjective:     Chief Complaint:  Fall (Patient slipped on the ice) and Leg Injury (L leg. Obvious deformity given 100mcg of fentanyl in route)     Pt went  open reduction/internal fixation of tibia shaft with nail and closed management of proximal fibula fracture.  Tolerated surgery well. No new complaints or concerns.  Pain is under control. Headache under control         Assessment and Plan:     Acute displaced distal tibial shaft fracture, Acute nondisplaced comminuted proximal fibular  2/2 mechanical fall  Concern for osteoporosis  S/p  open reduction/internal fixation of tibia shaft with nail and closed management of proximal fibula fracture 2024  slipped on ice while on a walk  morning  -has had prior fractures in the past  -Patient's mother did have osteoporosis  -Ortho consulted by ED  - Ortho mananging  - pain management, DVT prophylaxis  - await PT/OT recs      Hypothyroidism  Continue levothyroxine       Personally reviewed Lab Studies and Imaging     Drugs that require monitoring for toxicity include lovenox and the method of monitoring was cbc      Diet ADULT DIET; Regular   DVT Prophylaxis [x] Lovenox, []  Heparin, [] SCDs, [] Ambulation,  [] Eliquis, [] Xarelto  [] Coumadin   Code Status Full Code   Disposition From: home  Expected Disposition: TBD  Estimated Date of Discharge:  1-2 days  Patient requires continued admission due to PT/OT   Surrogate Decision Maker/ POA      Review of Systems:    Review of Systems    See above    Objective:     Intake/Output Summary (Last 24 hours) at 2024 1356  Last data filed at 2024 1211  Gross per 24 hour   Intake 1330 ml   Output 100 ml   Net 1230 ml        Vitals:  avulsion fracture versus fragmented enthesophyte.  Recommend point tenderness. 4. Mild diffuse soft tissue swelling.       Electronically signed by Efren Sen MD on 1/18/2024 at 1:56 PM

## 2024-01-18 NOTE — CARE COORDINATION
Post op day 1 for L tib fx. PT ordered for discharge recs. CM placed PS to Dr Sage for OT order. LH

## 2024-01-19 PROCEDURE — 2580000003 HC RX 258: Performed by: STUDENT IN AN ORGANIZED HEALTH CARE EDUCATION/TRAINING PROGRAM

## 2024-01-19 PROCEDURE — 6360000002 HC RX W HCPCS: Performed by: ORTHOPAEDIC SURGERY

## 2024-01-19 PROCEDURE — 94761 N-INVAS EAR/PLS OXIMETRY MLT: CPT

## 2024-01-19 PROCEDURE — 6370000000 HC RX 637 (ALT 250 FOR IP): Performed by: ORTHOPAEDIC SURGERY

## 2024-01-19 PROCEDURE — 2580000003 HC RX 258: Performed by: ORTHOPAEDIC SURGERY

## 2024-01-19 PROCEDURE — 97116 GAIT TRAINING THERAPY: CPT

## 2024-01-19 PROCEDURE — 94150 VITAL CAPACITY TEST: CPT

## 2024-01-19 PROCEDURE — 1200000000 HC SEMI PRIVATE

## 2024-01-19 RX ORDER — OXYCODONE HYDROCHLORIDE AND ACETAMINOPHEN 5; 325 MG/1; MG/1
2 TABLET ORAL EVERY 6 HOURS PRN
Qty: 40 TABLET | Refills: 0 | Status: SHIPPED | OUTPATIENT
Start: 2024-01-19 | End: 2024-01-26

## 2024-01-19 RX ORDER — CYCLOBENZAPRINE HCL 10 MG
10 TABLET ORAL 3 TIMES DAILY PRN
Qty: 30 TABLET | Refills: 0 | Status: SHIPPED | OUTPATIENT
Start: 2024-01-19 | End: 2024-01-29

## 2024-01-19 RX ORDER — DOCUSATE SODIUM 100 MG/1
100 CAPSULE, LIQUID FILLED ORAL DAILY PRN
Qty: 30 CAPSULE | Refills: 0 | Status: SHIPPED | OUTPATIENT
Start: 2024-01-19

## 2024-01-19 RX ORDER — ASPIRIN 325 MG
325 TABLET ORAL 2 TIMES DAILY
Qty: 60 TABLET | Refills: 0 | Status: SHIPPED | OUTPATIENT
Start: 2024-01-19

## 2024-01-19 RX ORDER — SODIUM CHLORIDE, SODIUM LACTATE, POTASSIUM CHLORIDE, CALCIUM CHLORIDE 600; 310; 30; 20 MG/100ML; MG/100ML; MG/100ML; MG/100ML
INJECTION, SOLUTION INTRAVENOUS CONTINUOUS
Status: DISCONTINUED | OUTPATIENT
Start: 2024-01-19 | End: 2024-01-19

## 2024-01-19 RX ADMIN — CYCLOBENZAPRINE 10 MG: 10 TABLET, FILM COATED ORAL at 18:35

## 2024-01-19 RX ADMIN — HYDROMORPHONE HYDROCHLORIDE 0.5 MG: 1 INJECTION, SOLUTION INTRAMUSCULAR; INTRAVENOUS; SUBCUTANEOUS at 17:03

## 2024-01-19 RX ADMIN — ENOXAPARIN SODIUM 40 MG: 100 INJECTION SUBCUTANEOUS at 08:47

## 2024-01-19 RX ADMIN — HYDROCODONE BITARTRATE AND ACETAMINOPHEN 1 TABLET: 5; 325 TABLET ORAL at 21:38

## 2024-01-19 RX ADMIN — OXYCODONE AND ACETAMINOPHEN 2 TABLET: 5; 325 TABLET ORAL at 13:28

## 2024-01-19 RX ADMIN — LEVOTHYROXINE SODIUM 150 MCG: 0.07 TABLET ORAL at 06:32

## 2024-01-19 RX ADMIN — OXYCODONE AND ACETAMINOPHEN 2 TABLET: 5; 325 TABLET ORAL at 18:35

## 2024-01-19 RX ADMIN — SODIUM CHLORIDE, POTASSIUM CHLORIDE, SODIUM LACTATE AND CALCIUM CHLORIDE: 600; 310; 30; 20 INJECTION, SOLUTION INTRAVENOUS at 08:53

## 2024-01-19 RX ADMIN — VILAZODONE HYDROCHLORIDE 20 MG: 10 TABLET, FILM COATED ORAL at 08:46

## 2024-01-19 RX ADMIN — CARIPRAZINE 1.5 MG: 1.5 CAPSULE, GELATIN COATED ORAL at 08:46

## 2024-01-19 RX ADMIN — HYDROMORPHONE HYDROCHLORIDE 0.5 MG: 1 INJECTION, SOLUTION INTRAMUSCULAR; INTRAVENOUS; SUBCUTANEOUS at 03:44

## 2024-01-19 RX ADMIN — ROSUVASTATIN CALCIUM 40 MG: 20 TABLET, FILM COATED ORAL at 18:35

## 2024-01-19 RX ADMIN — SODIUM CHLORIDE, PRESERVATIVE FREE 10 ML: 5 INJECTION INTRAVENOUS at 03:44

## 2024-01-19 RX ADMIN — OXYCODONE AND ACETAMINOPHEN 2 TABLET: 5; 325 TABLET ORAL at 08:46

## 2024-01-19 RX ADMIN — HYDROMORPHONE HYDROCHLORIDE 0.5 MG: 1 INJECTION, SOLUTION INTRAMUSCULAR; INTRAVENOUS; SUBCUTANEOUS at 09:45

## 2024-01-19 ASSESSMENT — PAIN DESCRIPTION - ORIENTATION
ORIENTATION: LEFT
ORIENTATION: LEFT;LOWER
ORIENTATION: LEFT

## 2024-01-19 ASSESSMENT — PAIN SCALES - GENERAL
PAINLEVEL_OUTOF10: 5
PAINLEVEL_OUTOF10: 7
PAINLEVEL_OUTOF10: 3
PAINLEVEL_OUTOF10: 0
PAINLEVEL_OUTOF10: 7
PAINLEVEL_OUTOF10: 10
PAINLEVEL_OUTOF10: 10
PAINLEVEL_OUTOF10: 8
PAINLEVEL_OUTOF10: 8

## 2024-01-19 ASSESSMENT — PAIN DESCRIPTION - FREQUENCY
FREQUENCY: CONTINUOUS

## 2024-01-19 ASSESSMENT — PAIN - FUNCTIONAL ASSESSMENT
PAIN_FUNCTIONAL_ASSESSMENT: PREVENTS OR INTERFERES SOME ACTIVE ACTIVITIES AND ADLS
PAIN_FUNCTIONAL_ASSESSMENT: ACTIVITIES ARE NOT PREVENTED

## 2024-01-19 ASSESSMENT — PAIN DESCRIPTION - ONSET
ONSET: ON-GOING

## 2024-01-19 ASSESSMENT — PAIN DESCRIPTION - DESCRIPTORS
DESCRIPTORS: ACHING
DESCRIPTORS: THROBBING;BURNING;SHARP
DESCRIPTORS: ACHING;SORE;THROBBING
DESCRIPTORS: ACHING;SORE
DESCRIPTORS: SHARP
DESCRIPTORS: DISCOMFORT;SORE;SHOOTING
DESCRIPTORS: ACHING;THROBBING

## 2024-01-19 ASSESSMENT — PAIN DESCRIPTION - LOCATION
LOCATION: LEG
LOCATION: KNEE
LOCATION: LEG
LOCATION: LEG;HIP
LOCATION: LEG

## 2024-01-19 ASSESSMENT — PAIN SCALES - WONG BAKER: WONGBAKER_NUMERICALRESPONSE: 10

## 2024-01-19 ASSESSMENT — PAIN DESCRIPTION - PAIN TYPE
TYPE: SURGICAL PAIN

## 2024-01-19 NOTE — PROGRESS NOTES
for exam:->fall, deformity Reason for Exam: pain and deformity after fall FINDINGS: Left knee: The tricompartmental joint spaces appear maintained.  No discrete joint effusion.  The distal femur and patella appear intact. Left tibia and fibula: Acute displaced spiral fracture of the distal tibial shaft.  There is focal anterior and lateral soft tissue swelling.  Acute nondisplaced comminuted fracture of the proximal fibula metaphysis extending to the proximal tibiofibular joint. Left ankle and left foot: Osseous fragments adjacent to the calcaneus could represent avulsion fracture of the Achilles versus fragmented enthesophyte. The tibial plafond and talar dome appear intact.  Suboptimal oblique view somewhat limits evaluation.  Overlying artifact obscures the fine anatomic detail.  The tarsometatarsal alignment appears grossly maintained, although suboptimal patient positioning somewhat limits evaluation.  No discrete fracture of the forefoot is identified.  Mild diffuse soft tissue swelling.     1. Acute displaced distal tibial shaft fracture. 2. Acute nondisplaced comminuted proximal fibular head fracture. 3. Osseous fragments at the posterior margin of the calcaneus could represent an acute avulsion fracture versus fragmented enthesophyte.  Recommend point tenderness. 4. Mild diffuse soft tissue swelling.     XR ANKLE LEFT (MIN 3 VIEWS)    Result Date: 1/16/2024  EXAMINATION: TWO XRAY VIEWS OF THE LEFT TIBIA AND FIBULA; THREE XRAY VIEWS OF THE LEFT KNEE; THREE XRAY VIEWS OF THE LEFT ANKLE; THREE XRAY VIEWS OF THE LEFT FOOT 1/16/2024 11:27 am COMPARISON: None HISTORY: ORDERING SYSTEM PROVIDED HISTORY: fall, deformity TECHNOLOGIST PROVIDED HISTORY: Reason for exam:->fall, deformity Reason for Exam: pain and deformity after fall FINDINGS: Left knee: The tricompartmental joint spaces appear maintained.  No discrete joint effusion.  The distal femur and patella appear intact. Left tibia and fibula: Acute displaced  fracture of the distal tibial shaft.  There is focal anterior and lateral soft tissue swelling.  Acute nondisplaced comminuted fracture of the proximal fibula metaphysis extending to the proximal tibiofibular joint. Left ankle and left foot: Osseous fragments adjacent to the calcaneus could represent avulsion fracture of the Achilles versus fragmented enthesophyte. The tibial plafond and talar dome appear intact.  Suboptimal oblique view somewhat limits evaluation.  Overlying artifact obscures the fine anatomic detail.  The tarsometatarsal alignment appears grossly maintained, although suboptimal patient positioning somewhat limits evaluation.  No discrete fracture of the forefoot is identified.  Mild diffuse soft tissue swelling.     1. Acute displaced distal tibial shaft fracture. 2. Acute nondisplaced comminuted proximal fibular head fracture. 3. Osseous fragments at the posterior margin of the calcaneus could represent an acute avulsion fracture versus fragmented enthesophyte.  Recommend point tenderness. 4. Mild diffuse soft tissue swelling.     XR KNEE LEFT (3 VIEWS)    Result Date: 1/16/2024  EXAMINATION: TWO XRAY VIEWS OF THE LEFT TIBIA AND FIBULA; THREE XRAY VIEWS OF THE LEFT KNEE; THREE XRAY VIEWS OF THE LEFT ANKLE; THREE XRAY VIEWS OF THE LEFT FOOT 1/16/2024 11:27 am COMPARISON: None HISTORY: ORDERING SYSTEM PROVIDED HISTORY: fall, deformity TECHNOLOGIST PROVIDED HISTORY: Reason for exam:->fall, deformity Reason for Exam: pain and deformity after fall FINDINGS: Left knee: The tricompartmental joint spaces appear maintained.  No discrete joint effusion.  The distal femur and patella appear intact. Left tibia and fibula: Acute displaced spiral fracture of the distal tibial shaft.  There is focal anterior and lateral soft tissue swelling.  Acute nondisplaced comminuted fracture of the proximal fibula metaphysis extending to the proximal tibiofibular joint. Left ankle and left foot: Osseous fragments adjacent

## 2024-01-19 NOTE — DISCHARGE INSTRUCTIONS
Orthopedic discharge instructions:  Keep splint in place until follow-up visit.  Keep splint clean and dry.  Okay to remove Ace wrap and gauze dressings at the knee and change as needed.  Okay for range of motion of the knee and toes as tolerated.    Remain nonweightbearing in the splint and use walker or crutches for ambulation.    Call the office to schedule follow-up appointment for 7 to 10 days.    Take aspirin 325 mg twice a day for DVT prophylaxis.    Use pain medication as needed.  Prescription for Percocet has been sent to the pharmacy.  Wean off of this medication as pain allows.  You can call the office for refill if needed.    Call the office if there is any concern for worsening pain, swelling, fevers or chills, drainage, redness, or other questions.

## 2024-01-19 NOTE — PLAN OF CARE
Problem: Skin/Tissue Integrity  Goal: Absence of new skin breakdown  Description: 1.  Monitor for areas of redness and/or skin breakdown  2.  Assess vascular access sites hourly  3.  Every 4-6 hours minimum:  Change oxygen saturation probe site  4.  Every 4-6 hours:  If on nasal continuous positive airway pressure, respiratory therapy assess nares and determine need for appliance change or resting period.  Outcome: Progressing     Problem: Safety - Adult  Goal: Free from fall injury  Outcome: Progressing     Problem: ABCDS Injury Assessment  Goal: Absence of physical injury  Outcome: Progressing     Problem: Discharge Planning  Goal: Discharge to home or other facility with appropriate resources  Outcome: Progressing  Flowsheets (Taken 1/18/2024 0598)  Discharge to home or other facility with appropriate resources: Identify discharge learning needs (meds, wound care, etc)     Problem: Pain  Goal: Verbalizes/displays adequate comfort level or baseline comfort level  Outcome: Progressing

## 2024-01-19 NOTE — PLAN OF CARE
Problem: Skin/Tissue Integrity  Goal: Absence of new skin breakdown  Description: 1.  Monitor for areas of redness and/or skin breakdown  2.  Assess vascular access sites hourly  3.  Every 4-6 hours minimum:  Change oxygen saturation probe site  4.  Every 4-6 hours:  If on nasal continuous positive airway pressure, respiratory therapy assess nares and determine need for appliance change or resting period.  1/19/2024 0928 by Sg Patton LPN  Outcome: Progressing  1/18/2024 2350 by Theresa Garcia, RN  Outcome: Progressing     Problem: Safety - Adult  Goal: Free from fall injury  1/19/2024 0928 by Sg Patton LPN  Outcome: Progressing  1/18/2024 2350 by Theresa Garcia RN  Outcome: Progressing     Problem: ABCDS Injury Assessment  Goal: Absence of physical injury  1/19/2024 0928 by Sg Patton LPN  Outcome: Progressing  1/18/2024 2350 by Theresa Garcia RN  Outcome: Progressing     Problem: Discharge Planning  Goal: Discharge to home or other facility with appropriate resources  1/19/2024 0928 by Sg Patton LPN  Outcome: Progressing  1/18/2024 2350 by Theresa Garcia RN  Outcome: Progressing  Flowsheets (Taken 1/18/2024 2145)  Discharge to home or other facility with appropriate resources: Identify discharge learning needs (meds, wound care, etc)     Problem: Pain  Goal: Verbalizes/displays adequate comfort level or baseline comfort level  1/19/2024 0928 by Sg Patton LPN  Outcome: Progressing  1/18/2024 2350 by Theresa Garcia, RN  Outcome: Progressing

## 2024-01-19 NOTE — PROGRESS NOTES
Pt upset and in pain.  She didn't think she would discharge today and now she has a discharge order.  I sent  over to pickup a walker as pt needs this for at home and ambulation.  Called [facility] andyamila hawkins egetting it ready

## 2024-01-19 NOTE — CARE COORDINATION
Dr. Sen [facility] and [facility] and  a walk[facility]. Pt is aware and verbalized understanding. Sophia peter nurse will talk with pt about need for walker at [place].

## 2024-01-19 NOTE — CARE COORDINATION
This RN case manager spoke with patient regarding Home Care. She is agreeable. She does not have a preference in agencies. Patient is worker's comp. Referrals made to [facility], [facility] and [facility].    VERBALIZING WISH TO HARM SELF/VERBALIZING SUICIDAL IDEATIONS

## 2024-01-19 NOTE — PROGRESS NOTES
Outpatient Pharmacy Progress Note for Meds-to-Beds    Total number of Prescriptions Filled: 4  The following medications were dispensed to the patient during the discharge process:  aspirin  docusate sodium  oxyCODONE-acetaminophen  Cyclobenzaprine     Additional Documentation:  Patient's family member picked-up the medication(s) in the OP Pharmacy      Thank you for letting us serve your patients.  [place] [id number]    Phone: [phone]    Fax: [phone]

## 2024-01-19 NOTE — CARE COORDINATION
Suleiman ZACKERY Mcghee was evaluated today and a DME order was entered for a wheeled walker because she requires this to successfully complete daily living tasks of eating, bathing, toileting, personal cares, ambulating, grooming, hygiene, dressing upper body, dressing lower body, meal preparation, and taking own medications.  A wheeled walker is necessary due to the patient's unsteady gait, upper body weakness, and inability to  an ambulation device; and she can ambulate only by pushing a walker instead of a lesser assistive device such as a cane, crutch, or standard walker.  The need for this equipment was discussed with the patient and she understands and is in agreement.

## 2024-01-19 NOTE — DISCHARGE SUMMARY
Discharge Summary    Name:  Suleiman Mcghee /Age/Sex: 1970  (53 y.o. female)   MRN & CSN:  [id number] & [id number] Admission Date/Time: 2024 10:20 AM   Attending:  Efren Sen MD Discharging Physician: Efren Sen MD     Hospital Course:   Suleiman Mcghee is a 53 y.o.  female  who presents with Closed displaced oblique fracture of shaft of left tibia    HPI  \"Suleiman Mcghee is a 53 y.o. female with pmh of hypothyroidism who presents with LLE pain after a fall.  Patient states that she went on a walk as she usually does and slipped and fell on the ice.  Her leg did bend backwards abnormally.  Patient states that she almost passed out from the pain she felt.  Patient has had multiple broken bones in the past including her arm.  Denies fevers, chills, chest pain, shortness of breath, nausea, vomiting, abdominal pain, changes in urination.  She does state that her mother had osteoporosis but she has not been diagnosed with it.  Patient does not smoke, no drug use and occasional alcohol use.     On arrival patient was stable and afebrile.  Labs are pending.  X-ray of the left leg showing acute displaced distal tibial shaft fracture, acute nondisplaced comminuted proximal fibular head fracture, osseous fragments at the posterior margin of calcaneus could represent acute avulsion fracture versus fragmented enthesophyte, mild diffuse soft tissue swelling  Patient received morphine 4 mg x 2.\"       The following problems have been addressed during this hospitalization.  Acute displaced distal tibial shaft fracture, Acute nondisplaced comminuted proximal fibular  2/2 mechanical fall  Concern for osteoporosis  S/p  open reduction/internal fixation of tibia shaft with nail and closed management of proximal fibula fracture 2024  slipped on ice while on a walk  morning  -has had prior fractures in the past  -Patient's mother did have osteoporosis  - Ortho mananging  - pain management, DVT  possible to manage pain Max Daily Amount: 8 tablets            CONTINUE taking these medications      cariprazine hcl 1.5 MG capsule  Commonly known as: VRAYLAR     levothyroxine 150 MCG tablet  Commonly known as: SYNTHROID     rizatriptan 10 MG tablet  Commonly known as: MAXALT     rosuvastatin 40 MG tablet  Commonly known as: CRESTOR     vilazodone HCl 20 MG Tabs  Commonly known as: VIIBRYD            STOP taking these medications      topiramate 25 MG tablet  Commonly known as: TOPAMAX               Where to Get Your Medications        These medications were sent to [address] - P [phone] - F [phone]  [address] [place] [address]      Phone: [phone]   aspirin 325 MG tablet  cyclobenzaprine 10 MG tablet  docusate sodium 100 MG capsule  oxyCODONE-acetaminophen 5-325 MG per tablet         Objective Findings at Discharge:       BMP/CBC  Recent Labs     01/18/24  0930 01/20/24  0418    144   K 3.9 4.1    109   CO2 25 28   BUN 13 7   CREATININE 0.9 0.8   WBC 9.1 6.8   HCT 35.3* 34.0*    243       IMAGING:      Additional Information: Patient seen and examined day of discharge. For more information regarding patient's care please contact [facility] medical records [phone]    Discharge Time of 35 minutes    Electronically signed by Efren Sen MD on 1/20/2024 at 11:01 AM

## 2024-01-19 NOTE — PROGRESS NOTES
Physical Therapy Treatment Note  Name: Suleiman Mcghee MRN: [id number] :   1970   Date:  2024   Admission Date: 2024 Room:  [address] [place] [address]   Restrictions/Precautions:  NWB L LE, general precautions, fall risk   Communication with other providers:  Pt okay to see for therapy per RN   Subjective:  Patient states:  \"I walked on the track two times a day\"   Pain:   Location, Type, Intensity (0/10 to 10/10):  2/10 at rest and with activity following IV pn meds, pt reports significantly higher pain levels prior to.   Objective:    Observation:  Pt supine in bed upon PTA arrival.   Objective Measures:  A & O   Treatment, including education/measures:    Therapeutic Activity Training:   Therapeutic activity training was instructed today.  Cues were given for safety, sequence, UE/LE placement, awareness, and balance.    Activities performed today included bed mobility training, sup-sit, sit-stand, SPT.    Mobility:  Sup > sit: SBA with increased time and effort.   Scooting: SBA   STS: CGA with increased effort, pt demos very good adherence to WB restrictions. CGA from BSC placed over commode.   SPT: From EOB > recliner with CGA for safety, good adherence to WB precautions.     PTA obtained recliner for chair follow with ambulation.     Gait:  Pt ambulated ~10ft x 2, and 30ft with RW and CGA for safety. Pt demos good step length, good adherence to WB precautions, decreased step length but with fair stability, fair gait speed considering WB precautions. PTA provided cues for maintenance of walker at midline for increased safety. Pt tolerated well.     Safety:   Pt returned safely to recliner with chair alarm activated, call light in reach, all needs met.   Assessment / Impression:    Pt tolerated OOB activities well this date with min increased in fatigue and fair endurance overall.   Patient's tolerance of treatment:  Good   Adverse Reaction: none  Significant change in status and impact:  none  Barriers to

## 2024-01-19 NOTE — PROGRESS NOTES
Suleiman ZACKERY Harperky (1970)    Daily Progress Note-  Rodney Sage MD, MD                     Today's Date:   1/19/2024          Subjective:     Doing well postoperatively.    Pain level is high this morning but reasonably controlled so far with combination of IV and p.o. medications    Objective:   No data found.  Afebrile vital signs are stable    Physical Exam:     The patient is awake and alert  Resting comfortably in bed    Operative extremity:    The dressing is clean dry and intact  Sensation and motor function intact distally  Compartments are soft and compressible.  Foot is warm and well-perfused.  Sensation is intact at the toes.  She has good range of motion of the toes.  Dressing was removed at the knee.  The incision site is intact with sutures.  No erythema or drainage      LABS   CBC:   Recent Labs     01/16/24  1335 01/17/24  0228 01/18/24  0930   WBC 11.7* 8.2 9.1   HGB 14.0 12.9 11.7*    270 235           Assessment and Plan     1.  POD # 2 left tibia intramedullary nail    1:  Physical therapy consult for mobilization   -Nonweightbearing left lower extremity  2:  DVT prophylaxis   -Lovenox while in the hospital.  Patient to be discharged on aspirin 325 twice daily for 1 month  3:  Continue Pain Control   -wean to PO meds, IV pain medications only for breakthrough pain  4.  Medical management per hospitalist service  5:  D/C Planning:     -Okay to discharge from orthopedic perspective if patient is mobilizing reasonably well with therapy and her pain is controlled.  Patient should follow-up in my office in 7 to 10 days for x-rays and wound check with likely suture removal and transition to a fracture boot         Rodney Sage MD

## 2024-01-20 VITALS
DIASTOLIC BLOOD PRESSURE: 69 MMHG | BODY MASS INDEX: 29.38 KG/M2 | OXYGEN SATURATION: 96 % | RESPIRATION RATE: 16 BRPM | HEART RATE: 71 BPM | WEIGHT: 187.17 LBS | TEMPERATURE: 98.7 F | SYSTOLIC BLOOD PRESSURE: 115 MMHG | HEIGHT: 67 IN

## 2024-01-20 LAB
ANION GAP SERPL CALCULATED.3IONS-SCNC: 7 MMOL/L (ref 7–16)
BASOPHILS ABSOLUTE: 0 K/CU MM
BASOPHILS RELATIVE PERCENT: 0.6 % (ref 0–1)
BUN SERPL-MCNC: 7 MG/DL (ref 6–23)
CALCIUM SERPL-MCNC: 8.1 MG/DL (ref 8.3–10.6)
CHLORIDE BLD-SCNC: 109 MMOL/L (ref 99–110)
CO2: 28 MMOL/L (ref 21–32)
CREAT SERPL-MCNC: 0.8 MG/DL (ref 0.6–1.1)
DIFFERENTIAL TYPE: ABNORMAL
EOSINOPHILS ABSOLUTE: 0.4 K/CU MM
EOSINOPHILS RELATIVE PERCENT: 5.3 % (ref 0–3)
GFR SERPL CREATININE-BSD FRML MDRD: >60 ML/MIN/1.73M2
GLUCOSE SERPL-MCNC: 97 MG/DL (ref 70–99)
HCT VFR BLD CALC: 34 % (ref 37–47)
HEMOGLOBIN: 10.9 GM/DL (ref 12.5–16)
IMMATURE NEUTROPHIL %: 0.3 % (ref 0–0.43)
LYMPHOCYTES ABSOLUTE: 2.3 K/CU MM
LYMPHOCYTES RELATIVE PERCENT: 33.1 % (ref 24–44)
MCH RBC QN AUTO: 29.9 PG (ref 27–31)
MCHC RBC AUTO-ENTMCNC: 32.1 % (ref 32–36)
MCV RBC AUTO: 93.2 FL (ref 78–100)
MONOCYTES ABSOLUTE: 0.6 K/CU MM
MONOCYTES RELATIVE PERCENT: 9.1 % (ref 0–4)
NUCLEATED RBC %: 0 %
PDW BLD-RTO: 13.5 % (ref 11.7–14.9)
PLATELET # BLD: 243 K/CU MM (ref 140–440)
PMV BLD AUTO: 9.8 FL (ref 7.5–11.1)
POTASSIUM SERPL-SCNC: 4.1 MMOL/L (ref 3.5–5.1)
RBC # BLD: 3.65 M/CU MM (ref 4.2–5.4)
SEGMENTED NEUTROPHILS ABSOLUTE COUNT: 3.5 K/CU MM
SEGMENTED NEUTROPHILS RELATIVE PERCENT: 51.6 % (ref 36–66)
SODIUM BLD-SCNC: 144 MMOL/L (ref 135–145)
TOTAL IMMATURE NEUTOROPHIL: 0.02 K/CU MM
TOTAL NUCLEATED RBC: 0 K/CU MM
WBC # BLD: 6.8 K/CU MM (ref 4–10.5)

## 2024-01-20 PROCEDURE — 85025 COMPLETE CBC W/AUTO DIFF WBC: CPT

## 2024-01-20 PROCEDURE — 6360000002 HC RX W HCPCS: Performed by: ORTHOPAEDIC SURGERY

## 2024-01-20 PROCEDURE — 36415 COLL VENOUS BLD VENIPUNCTURE: CPT

## 2024-01-20 PROCEDURE — 97116 GAIT TRAINING THERAPY: CPT

## 2024-01-20 PROCEDURE — 6370000000 HC RX 637 (ALT 250 FOR IP): Performed by: ORTHOPAEDIC SURGERY

## 2024-01-20 PROCEDURE — 94761 N-INVAS EAR/PLS OXIMETRY MLT: CPT

## 2024-01-20 PROCEDURE — 97530 THERAPEUTIC ACTIVITIES: CPT

## 2024-01-20 PROCEDURE — 80048 BASIC METABOLIC PNL TOTAL CA: CPT

## 2024-01-20 PROCEDURE — 2580000003 HC RX 258: Performed by: ORTHOPAEDIC SURGERY

## 2024-01-20 RX ADMIN — OXYCODONE AND ACETAMINOPHEN 2 TABLET: 5; 325 TABLET ORAL at 03:32

## 2024-01-20 RX ADMIN — CARIPRAZINE 1.5 MG: 1.5 CAPSULE, GELATIN COATED ORAL at 08:18

## 2024-01-20 RX ADMIN — ENOXAPARIN SODIUM 40 MG: 100 INJECTION SUBCUTANEOUS at 08:18

## 2024-01-20 RX ADMIN — OXYCODONE AND ACETAMINOPHEN 2 TABLET: 5; 325 TABLET ORAL at 08:18

## 2024-01-20 RX ADMIN — SODIUM CHLORIDE, PRESERVATIVE FREE 10 ML: 5 INJECTION INTRAVENOUS at 08:19

## 2024-01-20 RX ADMIN — LEVOTHYROXINE SODIUM 150 MCG: 0.07 TABLET ORAL at 08:18

## 2024-01-20 ASSESSMENT — PAIN DESCRIPTION - DESCRIPTORS
DESCRIPTORS: ACHING;SHARP;SHOOTING
DESCRIPTORS: ACHING
DESCRIPTORS: ACHING

## 2024-01-20 ASSESSMENT — PAIN DESCRIPTION - PAIN TYPE
TYPE: SURGICAL PAIN
TYPE: SURGICAL PAIN

## 2024-01-20 ASSESSMENT — PAIN DESCRIPTION - LOCATION
LOCATION: LEG

## 2024-01-20 ASSESSMENT — PAIN DESCRIPTION - FREQUENCY: FREQUENCY: CONTINUOUS

## 2024-01-20 ASSESSMENT — PAIN DESCRIPTION - ORIENTATION
ORIENTATION: LEFT;LOWER
ORIENTATION: LEFT
ORIENTATION: LEFT;LOWER

## 2024-01-20 ASSESSMENT — PAIN SCALES - GENERAL
PAINLEVEL_OUTOF10: 7
PAINLEVEL_OUTOF10: 6
PAINLEVEL_OUTOF10: 7
PAINLEVEL_OUTOF10: 5

## 2024-01-20 ASSESSMENT — PAIN SCALES - WONG BAKER: WONGBAKER_NUMERICALRESPONSE: 6

## 2024-01-20 ASSESSMENT — PAIN DESCRIPTION - ONSET: ONSET: ON-GOING

## 2024-01-20 NOTE — CARE COORDINATION
CM notes and obtained consult. SULTANA is aware that a referral has been sent to [facility], [facility], and [facility] for dc planning. SULTANA notes that OT is signed up to work with pt today. CM will continue following over the weekend for dc planning and make note of PT/OT recs.

## 2024-01-20 NOTE — CARE COORDINATION
DEANW notified by RN, Sg, that pt med stable for dc.  LSW reviewed chart; noted CM staff still identifying an available HC provider that accept's worker's comp ins.  LSW made f/u phone call to [facility], spoke to Loida, who reported they do not accept worker's comp.  LSW made f/u phone call to [facility], spoke to Vandana, who reported they do no accept worker's comp.  LSW made f/u phone call to [facility], spoke to Mari, who reported they do not accept worker's comp.  DEANW consulted with [facility], who reported worker's comp will not approve HC therapy w/o prior auth approval, which unfortunately cannot be completed over the weekend d/t closure. DEANW updated Dr. Sen, who will provide OPT order.  RN CM, Martina John, to update pt on plan of care.  Pt will need to be given a copy of her C9 form prior to dc and call her worker's comp CM on Monday to facilitate solidifying in-network OPT providers.  Electronically signed by SHARITA Mckeon on 1/20/2024 at 12:13 PM

## 2024-01-20 NOTE — PROGRESS NOTES
Physical Therapy Treatment Note  Name: Suleiman Mcghee MRN: [id number] :   1970   Date:  2024   Admission Date: 2024 Room:  [address] [place] [address]   Restrictions/Precautions:  NWB L LE, general precautions, fall risk    Communication with other providers:  Pt okay to see for therapy per RN   Subjective:  Patient states:  \"I'm staying with my ex  and his girlfriend after this\"   Pain:   Location, Type, Intensity (0/10 to 10/10):  4/10 with activity.   Objective:    Observation:  Pt supine in bed upon PTA arrival.   Objective Measures:  A & O   Treatment, including education/measures:    Therapeutic Activity Training:   Therapeutic activity training was instructed today.  Cues were given for safety, sequence, UE/LE placement, awareness, and balance.    Activities performed today included bed mobility training, sup-sit, sit-stand, SPT.    Mobility:  Sup > sit: SBA  Scooting: SBA  STS: CGA for safety with RW.     Gait:  Pt ambulated ~8ft + 15ft + 30ft with RW and CGA for safety. Pt demos good adherence to WB restrictions, fair balance and stability, good walker management, but overall decrease in endurance. PTA provided cues for forward gaze and for safety.     Education:  PTA provided pt with seated LE strength for B LE, and standing LE strength for L LE only. PTA demonstrated ea. Exercise for understanding and pt verbalizes good understanding overall, no questions at this time.   PTA educated pt on importance on maintaining UE and R LE strength as pt will be utilizing the same for mobility.     Safety:   Pt returned safely to recliner with chair alarm activated, call light in reach, all needs met.   Assessment / Impression:    Pt tolerated gait well this date, continues to demo decreased endurance.   Patient's tolerance of treatment:  Good   Adverse Reaction: none  Significant change in status and impact:  none  Barriers to improvement:  none  Plan for Next Session:    Plan to continue OOB  activities.   Time in:  1050  Time out:  1114  Timed treatment minutes:  24  Total treatment time:  24    Previously filed items:  Social/Functional History  Lives With: Alone  Type of Home: House  Home Layout: One level  Home Access: Stairs to enter with rails  Entrance Stairs - Number of Steps: 4  Bathroom Shower/Tub: Tub/Shower unit  Home Equipment:  (Does not use AD at baseline)  ADL Assistance: Independent  Homemaking Assistance: Independent  Homemaking Responsibilities: Yes  Ambulation Assistance: Independent  Transfer Assistance: Independent  Active : Yes        Short Term Goals  Time Frame for Short Term Goals: 1 week  Short Term Goal 1: pt to complete all bed mobility mod I  Short Term Goal 2: pt to complete all STS transfers to/from bed, commode, and chair mod I  Short Term Goal 3: pt to ambulate 50' with LRAD mod I    Electronically signed by:    Radha Navarrete PTA  1/20/2024, 12:38 PM

## 2024-01-20 NOTE — CARE COORDINATION
CM reviewed chart and saw patient at bedside.  Pt's spouse picked up foldering walker yesterday and is in pt's room.  CM gave patient copy of C9 form and order for outpatient PT.  Instructed patient to call her worker comp officer on Monday morning.  Pt is non bearing to affect extremity.  CM discussed w/ pt that if getting to outpatient therapy is too difficult to call her PCP and request HHC ordered.    CM remains available if needed.

## 2024-01-25 NOTE — PROGRESS NOTES
Patient seen in office today for: 1.5-2wk PO, Left tibia IM Nail insertion, DOS 1/17/2024    Patient reports /10 pain.  RICE and medication are effective to alleviate pain and reduce swelling.     Pain worsened by: Patient reports painful ROM and weight bearing    Xrays performed in office today.

## 2024-01-26 ENCOUNTER — OFFICE VISIT (OUTPATIENT)
Dept: ORTHOPEDIC SURGERY | Age: 54
End: 2024-01-26

## 2024-01-26 DIAGNOSIS — S82.232D CLOSED DISPLACED OBLIQUE FRACTURE OF SHAFT OF LEFT TIBIA WITH ROUTINE HEALING, SUBSEQUENT ENCOUNTER: Primary | ICD-10-CM

## 2024-01-26 RX ORDER — OXYCODONE HYDROCHLORIDE AND ACETAMINOPHEN 5; 325 MG/1; MG/1
1 TABLET ORAL EVERY 6 HOURS PRN
Qty: 28 TABLET | Refills: 0 | Status: SHIPPED | OUTPATIENT
Start: 2024-01-26 | End: 2024-02-02

## 2024-01-26 ASSESSMENT — ENCOUNTER SYMPTOMS
COLOR CHANGE: 0
SHORTNESS OF BREATH: 0
CHEST TIGHTNESS: 0

## 2024-01-26 NOTE — PATIENT INSTRUCTIONS
Continue weight-bearing as tolerated.  Continue range of motion exercises as instructed.  Ice and elevate as needed.  Tylenol or Motrin for pain.  Start wearing CAM boot, may be removed for ice and elevation and personal hygiene   Follow up in 3 weeks

## 2024-01-26 NOTE — PROGRESS NOTES
1/26/2024   Chief Complaint   Patient presents with    Post-Op Check     1.5-2wk PO, Left tibia IM Nail insertion, DOS 1/17/2024        History of Present Illness:                             Suleiman Mcghee is a 53 y.o. female who returns today for a follow-up visit following left tibial nail.  She has done well in her splint nonweightbearing.  No issues or problems.    Patient seen in office today for: 1.5-2wk PO, Left tibia IM Nail insertion, DOS 1/17/2024     RICE and medication are effective to alleviate pain and reduce swelling.      Pain worsened by: Patient reports painful ROM and weight bearing     Xrays performed in office today.        Medical History  Patient's medications, allergies, past medical, surgical, social and family histories were reviewed and updated as appropriate.      Review of Systems   Constitutional:  Negative for activity change and fever.   Respiratory:  Negative for chest tightness and shortness of breath.    Cardiovascular:  Negative for chest pain.   Skin:  Negative for color change.   Neurological:  Negative for dizziness.   Psychiatric/Behavioral:  The patient is not nervous/anxious.                                                Examination:  General Exam:  Vitals: There were no vitals taken for this visit.   Physical Exam     Left lower extremity:  Well-healed surgical scars over the medial aspect of the knee and tibia.  Compartments are soft through the calf and lower leg.  Normal alignment of the leg and ankle.  Sensation and motor function is intact throughout the lower extremity.  Ankle dorsiflexion is limited due to recent immobilization with approximately 10 degrees short of full dorsiflexion to neutral      She has good sensation and mobility at the toes.  Toes are pink and well-perfused      Diagnostic testing:  X-rays reviewed in office, I independently reviewed the films in the office today:     XR TIBIA FIBULA LEFT (2 VIEWS)    Result Date: 1/26/2024  2 views

## 2024-02-15 ENCOUNTER — OFFICE VISIT (OUTPATIENT)
Dept: ORTHOPEDIC SURGERY | Age: 54
End: 2024-02-15

## 2024-02-15 VITALS — OXYGEN SATURATION: 97 % | HEART RATE: 56 BPM | RESPIRATION RATE: 17 BRPM | TEMPERATURE: 97 F

## 2024-02-15 DIAGNOSIS — S82.232D CLOSED DISPLACED OBLIQUE FRACTURE OF SHAFT OF LEFT TIBIA WITH ROUTINE HEALING, SUBSEQUENT ENCOUNTER: Primary | ICD-10-CM

## 2024-02-15 PROCEDURE — 99024 POSTOP FOLLOW-UP VISIT: CPT | Performed by: ORTHOPAEDIC SURGERY

## 2024-02-15 NOTE — PATIENT INSTRUCTIONS
Continue weight-bearing as tolerated.  Continue range of motion exercises as instructed.  Ice and elevate as needed.  Wean yourself out of the boot.  Tylenol or Motrin for pain.  Out patient Physical Therapy has been ordered by your provider. [facility] will call you to set up therapy. If you have not heard from them within 24-48 hours of today's appointment, please reach out to them at [phone].  Follow up in 4 weeks

## 2024-02-15 NOTE — PROGRESS NOTES
Patient seen in office today for: BWC PO Left Tibia IM Nail Insertion, DOS 1/17/2024    Patient is down to one wedge in boot for healing.     Xrays performed in office today.      Implemented All Universal Safety Interventions:  [place] to call system. Call bell, personal items and telephone within reach. Instruct patient to call for assistance. Room bathroom lighting operational. Non-slip footwear when patient is off stretcher. Physically safe environment: no spills, clutter or unnecessary equipment. Stretcher in lowest position, wheels locked, appropriate side rails in place.

## 2024-02-16 ENCOUNTER — TELEPHONE (OUTPATIENT)
Dept: ORTHOPEDIC SURGERY | Age: 54
End: 2024-02-16

## 2024-02-21 NOTE — PROGRESS NOTES
2/15/2024   Chief Complaint   Patient presents with    Post-Op Check     BWC PO Left Tibia IM Nail Insertion, DOS 1/17/2024        History of Present Illness:                             Suleiman Mcghee is a 53 y.o. female who returns today for follow-up of her left tibia nail.  She has been doing well keeping her leg protected in the boot.  She feels that her foot is starting to come around with increased range of motion.  She feels that her leg is healing appropriately well and has no severe complaints or complications or setbacks today.    Patient seen in office today for: BWC PO Left Tibia IM Nail Insertion, DOS 1/17/2024     Patient is down to one wedge in boot for healing.      Xrays performed in office today.     Medical History  Patient's medications, allergies, past medical, surgical, social and family histories were reviewed and updated as appropriate.      Review of Systems                                            Examination:  General Exam:  Vitals: Pulse 56   Temp 97 °F (36.1 °C)   Resp 17   SpO2 97%    Physical Exam   Left lower extremity:  Well-healed surgical scars over the anterior aspect of the knee and lower leg.  Improved mild tenderness to palpation at the healing fracture sites of the proximal fibula and midshaft tibia.  No erythema, drainage, or induration at the surgical sites.  Calf is soft and nontender.  Good active and passive range of motion present at the knee and ankle with flexion and extension.  Sensation and motor function is intact throughout the foot and toes.  2+ DP pulse and brisk cap refill present.      Diagnostic testing:  X-rays reviewed in office, I independently reviewed the films in the office today:   2 views of the tibia and fibula show excellent alignment status post intramedullary nailing with evidence of interval healing along the fracture site.  Stable position of the hardware.  Normal alignment of the articular surfaces of the knee and ankle.  Healing

## 2024-03-08 ENCOUNTER — HOSPITAL ENCOUNTER (OUTPATIENT)
Dept: PHYSICAL THERAPY | Age: 54
Setting detail: THERAPIES SERIES
Discharge: HOME | End: 2024-03-08
Attending: ORTHOPAEDIC SURGERY
Payer: COMMERCIAL

## 2024-03-08 PROCEDURE — 97162 PT EVAL MOD COMPLEX 30 MIN: CPT

## 2024-03-08 PROCEDURE — 97110 THERAPEUTIC EXERCISES: CPT

## 2024-03-08 ASSESSMENT — PAIN SCALES - GENERAL: PAINLEVEL_OUTOF10: 2

## 2024-03-08 ASSESSMENT — PAIN DESCRIPTION - LOCATION: LOCATION: LEG

## 2024-03-08 ASSESSMENT — PAIN DESCRIPTION - PAIN TYPE: TYPE: SURGICAL PAIN

## 2024-03-08 ASSESSMENT — PAIN DESCRIPTION - DESCRIPTORS: DESCRIPTORS: ACHING;BURNING;SHARP;DULL

## 2024-03-08 ASSESSMENT — PAIN DESCRIPTION - ORIENTATION: ORIENTATION: LEFT;ANTERIOR

## 2024-03-08 NOTE — PLAN OF CARE
[facility]  [facility]  [address]  [facility] [address]  Dept: [phone]  Dept Fax: [phone]  Loc: [phone]    PHYSICAL THERAPY PLAN OF CARE: INITIAL EVALUATION    Patient: Suleiman Mcghee (53 y.o. female)   Examination Date: 2024  Plan of Care Certification Period: 3/8/2024 to        :  1970  MRN: [id number]  CSN: [id number]   Insurance: Payor: [employer] / Plan: [employer] / Product Type: *No Product type* /   Insurance ID: [id number] - (Worker's Comp) Secondary Insurance (if applicable):    Referring Physician: Rodney Sage MD     PCP: Jayy Diana MD Visits to Date/Visits Approved:   /      No Show/Cancelled Appts:   /       Medical Diagnosis: Closed displaced oblique fracture of shaft of left tibia with routine healing, subsequent encounter [Q14.917G]    Treatment Diagnosis: L Leg pain, weakness, knee/ankle stiffness, gait dysfunction.       ASSESSMENT     Impression:  Pt is 53 year old female s/p L tibia IM nail insertion 24 after displaced comminuted tibia fracture. Pt now has difficulties completing transfers, general mobility, ADLs and community activities. Pt demo deficits this date that include L LE global weakness, stiffness into L knee/ankle, weightbearing tolerance, gait dysfunction and pain. Pt will benefit with PT services with progression of strength/ROM, gait training and modalities to return to PLOF. Pt prior to onset of current condition had no pain with able to complete full ADLs and work activities. Patient received education on their current pathology and how their condition effects them with their functional activities. Patient understood discussion and questions were answered. Patient understands their activity limitations and understands rational for treatment progression.     Body Structures, Functions, Activity Limitations Requiring Skilled Therapeutic Intervention: Decreased

## 2024-03-08 NOTE — PROGRESS NOTES
risk.                                                       TREATMENT PLAN       Requires PT Follow-Up: Yes  Specific Instructions for Next Treatment: review HEP, knee ROM to end ranges as able, quad activation, WB onto knee as able, gait training, balance stability, modalities PRN.    Pt. actively involved in establishing Plan of Care and Goals: Yes  Patient/ Caregiver education and instruction: Goals, PT Role, Plan of Care, Evaluative findings, Insurance, Home Exercise Program             Treatment may include any combination of the following: Current Treatment Recommendations: Strengthening, ROM, Manual, Gait training, Modalities, Home exercise program, Neuromuscular re-education, Therapeutic activities  Modalities: Heat/Cold, Vasopneumatic Device     Frequency / Duration:  Patient to be seen 2 for 6 weeks      Eval Complexity: Overall Evaluation : Medium  Decision Making: Medium Complexity  History: Personal Factors and/or Comorbidities Impacting POC: Medium  Examination of body system(s) including body structures and functions, activity limitations, and/or participation restrictions: Medium  Clinical Presentation: Medium         Therapist Signature: Yeyo Wolfe, PT, DPT, OCS    Date: 3/8/2024     3/8/2024 11:10 AM   I certify that the above Therapy Services are being furnished while the patient is under my care. I agree with the treatment plan and certify that this therapy is necessary.      Physician's Signature:  ___________________________   Date:_______                                                                   Rodney Sage MD        Physician Comments: _______________________________________________    Please sign and return to [facility].  Please fax to the location listed below. THANK YOU for this referral!    [facility]  [address]  [facility] [address]  Dept: [phone]  Dept Fax:

## 2024-03-08 NOTE — FLOWSHEET NOTE
s/p L tibia IM nail insertion 1/17/24 after displaced comminuted tibia fracture. Pt now has difficulties completing transfers, general mobility, ADLs and community activities. Pt demo deficits this date that include L LE global weakness, stiffness into L knee/ankle, weightbearing tolerance, gait dysfunction and pain. Pt will benefit with PT services with progression of strength/ROM, gait training and modalities to return to PLOF. Pt prior to onset of current condition had no pain with able to complete full ADLs and work activities. Patient received education on their current pathology and how their condition effects them with their functional activities. Patient understood discussion and questions were answered. Patient understands their activity limitations and understands rational for treatment progression.     Plan for Next Session: review HEP, knee ROM to end ranges as able, quad activation, WB onto knee as able, gait training, balance stability, modalities PRN.          Time In / Time Out:    9155-9399         If BWC Please Indicate Time In/Out/Total Time  CPT Code Time in Time out Total Time   TE 33841  1055  1105  10'   Neuro Re-ed 91499         TA 18048         Vaso 61169         Mod Eval 96345 1020 1055 35'   Aquatics 54649               Total for session              Timed Code/Total Treatment Minutes:  10/45'    10' TE,  1 PT eval       Next Progress Note due:  Eval 3/8/24   Visit 10       Plan of Care Interventions:  [x] Therapeutic Exercise  [x] Modalities:  [x] Therapeutic Activity     [] Ultrasound  [] Estim  [x] Gait Training      [] Cervical Traction [] Lumbar Traction  [x] Neuromuscular Re-education    [x] Cold/hotpack [] Iontophoresis   [x] Instruction in HEP      [x] Vasopneumatic   [] Dry Needling    [x] Manual Therapy               [] Aquatic Therapy              Electronically signed by:  Yeyo Wolfe, PT, DPT, OCS  3/8/2024, 8:00 AM    3/8/2024 8:00 AM

## 2024-03-11 ENCOUNTER — HOSPITAL ENCOUNTER (OUTPATIENT)
Dept: PHYSICAL THERAPY | Age: 54
Setting detail: THERAPIES SERIES
Discharge: HOME | End: 2024-03-11
Attending: ORTHOPAEDIC SURGERY
Payer: COMMERCIAL

## 2024-03-11 PROCEDURE — 97110 THERAPEUTIC EXERCISES: CPT

## 2024-03-11 NOTE — FLOWSHEET NOTE
Outpatient Physical Therapy  [place]           [x] Phone: [phone]   Fax: [phone]  [place]           [] Phone: [phone]   Fax: [phone]        Physical Therapy Daily Treatment Note  Date:  3/11/2024    Patient Name:  Suleiman Mcghee    :  1970  MRN: [id number]  Restrictions/Precautions: WBAT     Diagnosis:   Closed displaced oblique fracture of shaft of left tibia with routine healing, subsequent encounter [N94.835K]    Date of Injury/Surgery: 24  Treatment Diagnosis:  L Leg pain, weakness, knee/ankle stiffness, gait dysfunction.  Insurance/Certification information:   [facility]    Referring Physician:  Rodney Sage MD     PCP: Jayy Diana MD  Next Doctor Visit:    Plan of care signed (Y/N):  N,sent 3/8/24   Outcome Measure: LEFS:   Visit# / total visits:    per POC  Pain level: 3/10   Goals:     Patient goals: return to full function.  Short term goals  Time Frame for Short term goals: Defer to Long Term Goals    Long Term Goals Completed by 6 weeks 24       Long Term Goals: 6 weeks   Long Term Goal 1: Pt will demo I with HEP/symptom management.   Long Term Goal 2: Pt will demo normal gait mechanics with min/no deficits to ease community mobility.   Long Term Goal 3: Pt will demo 0-130 deg knee A/PROM to ease transfers.   Long Term Goal 4: Pt will completed TUG <12 sec to demo improved mobility.   Long Term Goal 5: Pt will demo >50/80 improvement per LEFS to demo improved functional mobility.   Long Term Goal 6: Pt will demo 5x sit to stand <15 sec to demo improved LE strength and ease with transfers.      Long Term Goal 7: Pt will demo >10 sec with L SLS/tandem stance to demo improved weightbearing tolerance and balance stability to decrease fall risk.         Summary of Evaluation:   Pt is 53 year old female s/p L tibia IM nail insertion 24 after displaced comminuted tibia fracture. Pt now has difficulties completing transfers, general

## 2024-03-14 ENCOUNTER — OFFICE VISIT (OUTPATIENT)
Dept: ORTHOPEDIC SURGERY | Age: 54
End: 2024-03-14

## 2024-03-14 ENCOUNTER — HOSPITAL ENCOUNTER (OUTPATIENT)
Dept: PHYSICAL THERAPY | Age: 54
Setting detail: THERAPIES SERIES
Discharge: HOME | End: 2024-03-14
Attending: ORTHOPAEDIC SURGERY
Payer: COMMERCIAL

## 2024-03-14 VITALS — HEART RATE: 64 BPM | BODY MASS INDEX: 29.32 KG/M2 | RESPIRATION RATE: 17 BRPM | HEIGHT: 67 IN | OXYGEN SATURATION: 99 %

## 2024-03-14 DIAGNOSIS — Z09 POSTOPERATIVE EXAMINATION: ICD-10-CM

## 2024-03-14 DIAGNOSIS — S82.232D CLOSED DISPLACED OBLIQUE FRACTURE OF SHAFT OF LEFT TIBIA WITH ROUTINE HEALING, SUBSEQUENT ENCOUNTER: Primary | ICD-10-CM

## 2024-03-14 PROCEDURE — 97140 MANUAL THERAPY 1/> REGIONS: CPT

## 2024-03-14 PROCEDURE — 97110 THERAPEUTIC EXERCISES: CPT

## 2024-03-14 PROCEDURE — 99024 POSTOP FOLLOW-UP VISIT: CPT | Performed by: ORTHOPAEDIC SURGERY

## 2024-03-14 NOTE — PATIENT INSTRUCTIONS
Continue weight-bearing as tolerated.  Continue range of motion exercises as instructed.  Ice and elevate as needed.  Tylenol or Motrin for pain.  May return to work at home.  Follow up in 4 weeks.    We are committed to providing you the best care possible.     If you receive a survey after visiting one of our offices, please take time to share your experience concerning your physician office visit.  These surveys are confidential and no health information about you is shared.     We are eager to improve for you and we are counting on your feedback to help make that happen.

## 2024-03-14 NOTE — FLOWSHEET NOTE
Time  CPT Code Time in Time out Total Time   TE 59196  1105 1135 30   Neuro Re-ed 73016       TA 10620       Manual 41150 1135 1150 15   Vaso 56701       Mod Eval 46634      Aquatics 97934               Total for session      45        Timed Code/Total Treatment Minutes:   45'/45'     2 TE 1 MT      Next Progress Note due:  Eval 3/8/24   Visit 10       Plan of Care Interventions:  [x] Therapeutic Exercise  [x] Modalities:  [x] Therapeutic Activity     [] Ultrasound  [] Estim  [x] Gait Training      [] Cervical Traction [] Lumbar Traction  [x] Neuromuscular Re-education    [x] Cold/hotpack [] Iontophoresis   [x] Instruction in HEP      [x] Vasopneumatic   [] Dry Needling    [x] Manual Therapy               [] Aquatic Therapy              Electronically signed by:  Sarina Philippe PTA, CLT 03/14/24 12:08 PM

## 2024-03-18 ENCOUNTER — HOSPITAL ENCOUNTER (OUTPATIENT)
Dept: PHYSICAL THERAPY | Age: 54
Setting detail: THERAPIES SERIES
Discharge: HOME | End: 2024-03-18
Attending: ORTHOPAEDIC SURGERY
Payer: COMMERCIAL

## 2024-03-18 PROCEDURE — 97140 MANUAL THERAPY 1/> REGIONS: CPT

## 2024-03-18 PROCEDURE — 97110 THERAPEUTIC EXERCISES: CPT

## 2024-03-18 ASSESSMENT — ENCOUNTER SYMPTOMS
EYE REDNESS: 0
EYE PAIN: 0
SHORTNESS OF BREATH: 0
VOMITING: 0
CHEST TIGHTNESS: 0
COLOR CHANGE: 0

## 2024-03-18 NOTE — FLOWSHEET NOTE
Outpatient Physical Therapy  [place]           [x] Phone: [phone]   Fax: [phone]  [place]           [] Phone: [phone]   Fax: [phone]        Physical Therapy Daily Treatment Note  Date:  3/18/2024    Patient Name:  Suleiman Mcghee    :  1970  MRN: [id number]  Restrictions/Precautions: WBAT     Diagnosis:   Closed displaced oblique fracture of shaft of left tibia with routine healing, subsequent encounter [V18.321R]    Date of Injury/Surgery: 24  Treatment Diagnosis:  L Leg pain, weakness, knee/ankle stiffness, gait dysfunction.  Insurance/Certification information:   [facility]    Referring Physician:  Rodney Sage MD     PCP: Jayy Diana MD  Next Doctor Visit:    Plan of care signed (Y/N):  ,sent 3/8/24   Outcome Measure: LEFS:   Visit# / total visits:   per POC  Pain level: 3/10 L knee and ankle  Goals:     Patient goals: return to full function.  Short term goals  Time Frame for Short term goals: Defer to Long Term Goals    Long Term Goals Completed by 6 weeks 24       Long Term Goals: 6 weeks   Long Term Goal 1: Pt will demo I with HEP/symptom management.   Long Term Goal 2: Pt will demo normal gait mechanics with min/no deficits to ease community mobility.   Long Term Goal 3: Pt will demo 0-130 deg knee A/PROM to ease transfers.   Long Term Goal 4: Pt will completed TUG <12 sec to demo improved mobility.   Long Term Goal 5: Pt will demo >50/80 improvement per LEFS to demo improved functional mobility.   Long Term Goal 6: Pt will demo 5x sit to stand <15 sec to demo improved LE strength and ease with transfers.      Long Term Goal 7: Pt will demo >10 sec with L SLS/tandem stance to demo improved weightbearing tolerance and balance stability to decrease fall risk.         Summary of Evaluation:   Pt is 53 year old female s/p L tibia IM nail insertion 24 after displaced comminuted tibia fracture. Pt now has difficulties completing transfers,

## 2024-03-20 ENCOUNTER — HOSPITAL ENCOUNTER (OUTPATIENT)
Dept: PHYSICAL THERAPY | Age: 54
Setting detail: THERAPIES SERIES
Discharge: HOME | End: 2024-03-20
Attending: ORTHOPAEDIC SURGERY
Payer: COMMERCIAL

## 2024-03-20 PROCEDURE — 97110 THERAPEUTIC EXERCISES: CPT

## 2024-03-20 PROCEDURE — 97016 VASOPNEUMATIC DEVICE THERAPY: CPT

## 2024-03-20 PROCEDURE — 97140 MANUAL THERAPY 1/> REGIONS: CPT

## 2024-03-20 NOTE — FLOWSHEET NOTE
Outpatient Physical Therapy  [place]           [x] Phone: [phone]   Fax: [phone]  [place]           [] Phone: [phone]   Fax: [phone]        Physical Therapy Daily Treatment Note  Date:  3/20/2024    Patient Name:  Suleiman Mcghee    :  1970  MRN: [id number]  Restrictions/Precautions: WBAT     Diagnosis:   Closed displaced oblique fracture of shaft of left tibia with routine healing, subsequent encounter [S94.419C]    Date of Injury/Surgery: 24  Treatment Diagnosis:  L Leg pain, weakness, knee/ankle stiffness, gait dysfunction.  Insurance/Certification information:   [facility]    Referring Physician:  Rodney Sage MD     PCP: Jayy Diana MD  Next Doctor Visit:    Plan of care signed (Y/N):  ,sent 3/8/24   Outcome Measure: LEFS: 24/80  Visit# / total visits:  5/12 per POC  Pain level: 4-5/10 L knee and ankle  Goals:     Patient goals: return to full function.  Short term goals  Time Frame for Short term goals: Defer to Long Term Goals    Long Term Goals Completed by 6 weeks 24       Long Term Goals: 6 weeks   Long Term Goal 1: Pt will demo I with HEP/symptom management.   Long Term Goal 2: Pt will demo normal gait mechanics with min/no deficits to ease community mobility.   Long Term Goal 3: Pt will demo 0-130 deg knee A/PROM to ease transfers.   Long Term Goal 4: Pt will completed TUG <12 sec to demo improved mobility.   Long Term Goal 5: Pt will demo >50/80 improvement per LEFS to demo improved functional mobility.   Long Term Goal 6: Pt will demo 5x sit to stand <15 sec to demo improved LE strength and ease with transfers.      Long Term Goal 7: Pt will demo >10 sec with L SLS/tandem stance to demo improved weightbearing tolerance and balance stability to decrease fall risk.         Summary of Evaluation:   Pt is 53 year old female s/p L tibia IM nail insertion 24 after displaced comminuted tibia fracture. Pt now has difficulties completing transfers,

## 2024-03-25 ENCOUNTER — HOSPITAL ENCOUNTER (OUTPATIENT)
Dept: PHYSICAL THERAPY | Age: 54
Setting detail: THERAPIES SERIES
Discharge: HOME | End: 2024-03-25
Attending: ORTHOPAEDIC SURGERY
Payer: COMMERCIAL

## 2024-03-25 PROCEDURE — 97116 GAIT TRAINING THERAPY: CPT

## 2024-03-25 PROCEDURE — 97110 THERAPEUTIC EXERCISES: CPT

## 2024-03-25 PROCEDURE — 97112 NEUROMUSCULAR REEDUCATION: CPT

## 2024-03-25 NOTE — FLOWSHEET NOTE
Outpatient Physical Therapy  [place]           [x] Phone: [phone]   Fax: [phone]  [place]           [] Phone: [phone]   Fax: [phone]        Physical Therapy Daily Treatment Note  Date:  3/25/2024    Patient Name:  Suleiman Mcghee    :  1970  MRN: [id number]  Restrictions/Precautions: WBAT     Diagnosis:   Closed displaced oblique fracture of shaft of left tibia with routine healing, subsequent encounter [Q93.247G]    Date of Injury/Surgery: 24  Treatment Diagnosis:  L Leg pain, weakness, knee/ankle stiffness, gait dysfunction.  Insurance/Certification information:   [facility]    Referring Physician:  Rodney Sage MD     PCP: Jayy Diana MD  Next Doctor Visit:    Plan of care signed (Y/N):  ,sent 3/8/24   Outcome Measure: LEFS: 80  Visit# / total visits:  /12 per POC  Pain level: 2/10 L knee and ankle  Goals:     Patient goals: return to full function.  Short term goals  Time Frame for Short term goals: Defer to Long Term Goals    Long Term Goals Completed by 6 weeks 24       Long Term Goals: 6 weeks   Long Term Goal 1: Pt will demo I with HEP/symptom management.   Long Term Goal 2: Pt will demo normal gait mechanics with min/no deficits to ease community mobility.   Long Term Goal 3: Pt will demo 0-130 deg knee A/PROM to ease transfers.   Long Term Goal 4: Pt will completed TUG <12 sec to demo improved mobility.   Long Term Goal 5: Pt will demo >50/80 improvement per LEFS to demo improved functional mobility.   Long Term Goal 6: Pt will demo 5x sit to stand <15 sec to demo improved LE strength and ease with transfers.      Long Term Goal 7: Pt will demo >10 sec with L SLS/tandem stance to demo improved weightbearing tolerance and balance stability to decrease fall risk.         Summary of Evaluation:   Pt is 53 year old female s/p L tibia IM nail insertion 24 after displaced comminuted tibia fracture. Pt now has difficulties completing transfers,

## 2024-03-27 ENCOUNTER — HOSPITAL ENCOUNTER (OUTPATIENT)
Dept: PHYSICAL THERAPY | Age: 54
Setting detail: THERAPIES SERIES
Discharge: HOME | End: 2024-03-27
Attending: ORTHOPAEDIC SURGERY
Payer: COMMERCIAL

## 2024-03-27 PROCEDURE — 97110 THERAPEUTIC EXERCISES: CPT

## 2024-03-27 PROCEDURE — 97112 NEUROMUSCULAR REEDUCATION: CPT

## 2024-03-27 NOTE — FLOWSHEET NOTE
10x2    Hip abd  10 x 2 ea LE at counter 10 x 2 ea LE at counter  RTB 10x2   STS  19\" table  10x2    Standing hamstring curl   10 x 2 ea LE at counter    Ambulating fwd and laterally in // bars  3x up and back ea dir no UE support     Ant step ups  4\" 10 x 2 R UE support in //bars    Shuttle LE press   L LE 1c 10x   1.5c 10x                     PROPRIOCEPTION      Cone taps  10 x 2 ea LE R UE support    Gt with SPC   1 full lap with emphasis on heel strike and toe off    Wobbleboard    Lateral 20\"x2   Airex step taps    6in cone 20x2   Lateral step overs    Foam beam 10x2   MODALITIES                      Other Therapeutic Activities/Education: --      3/20/ 2024 Pt was reassured by PTA and PT that this was a normal healing process and that the swelling would be monitored.     Home Exercise Program:  *HO issued, reviewed and discussed with patient. All questions answered. Pt agreed to comply.    3/14/24 standing gastroc/soleus stretch and heel toe ambulation with SPC    Manual Treatments:      Modalities:  declined      Communication with other providers:  POC sent 3/8/24       Assessment:   Pt tolerated treatment well today. Pt with near full ROM with min stiffness into terminal knee ext. Increased resistance and intensity and tolerated well.Appears progressing well despite concern of distal effusion at ankle. Will assess next visit and continue progression. Pt will continue to benefit from more weight bearing, strengthening, and balance.       Pt rated pain at 3.5/10 after treatment.         Pt is 53 year old female s/p L tibia IM nail insertion 1/17/24 after displaced comminuted tibia fracture. Pt now has difficulties completing transfers, general mobility, ADLs and community activities. Pt demo deficits this date that include L LE global weakness, stiffness into L knee/ankle, weightbearing tolerance, gait dysfunction and pain. Pt will benefit with PT services with progression of strength/ROM, gait training and

## 2024-04-01 ENCOUNTER — HOSPITAL ENCOUNTER (OUTPATIENT)
Dept: PHYSICAL THERAPY | Age: 54
Setting detail: THERAPIES SERIES
Discharge: HOME | End: 2024-04-01
Attending: ORTHOPAEDIC SURGERY
Payer: COMMERCIAL

## 2024-04-01 PROCEDURE — 97112 NEUROMUSCULAR REEDUCATION: CPT

## 2024-04-01 PROCEDURE — 97110 THERAPEUTIC EXERCISES: CPT

## 2024-04-01 NOTE — FLOWSHEET NOTE
Outpatient Physical Therapy  [place]           [x] Phone: [phone]   Fax: [phone]  [place]           [] Phone: [phone]   Fax: [phone]        Physical Therapy Daily Treatment Note  Date:  2024    Patient Name:  Suleiman Mcghee    :  1970  MRN: [id number]  Restrictions/Precautions: WBAT     Diagnosis:   Closed displaced oblique fracture of shaft of left tibia with routine healing, subsequent encounter [K31.718Y]    Date of Injury/Surgery: 24  Treatment Diagnosis:  L Leg pain, weakness, knee/ankle stiffness, gait dysfunction.  Insurance/Certification information:   [facility]    12 visits by 24   Referring Physician:  Rodney Sage MD     PCP: Jayy Diana MD  Next Doctor Visit:    Plan of care signed (Y/N):  ,sent 3/8/24   Outcome Measure: LEFS: 24/80  Visit# / total visits:   per POC  Pain level: 3/10 L knee and ankle  Goals:     Patient goals: return to full function.  Short term goals  Time Frame for Short term goals: Defer to Long Term Goals    Long Term Goals Completed by 6 weeks 24       Long Term Goals: 6 weeks   Long Term Goal 1: Pt will demo I with HEP/symptom management.   Long Term Goal 2: Pt will demo normal gait mechanics with min/no deficits to ease community mobility. Mostly MET   Long Term Goal 3: Pt will demo 0-130 deg knee A/PROM to ease transfers.  Mostly MET   Long Term Goal 4: Pt will completed TUG <12 sec to demo improved mobility.   Long Term Goal 5: Pt will demo >50/80 improvement per LEFS to demo improved functional mobility.   Long Term Goal 6: Pt will demo 5x sit to stand <15 sec to demo improved LE strength and ease with transfers.      Long Term Goal 7: Pt will demo >10 sec with L SLS/tandem stance to demo improved weightbearing tolerance and balance stability to decrease fall risk.         Summary of Evaluation:   Pt is 53 year old female s/p L tibia IM nail insertion 24 after displaced comminuted tibia fracture. Pt

## 2024-04-03 ENCOUNTER — HOSPITAL ENCOUNTER (OUTPATIENT)
Dept: PHYSICAL THERAPY | Age: 54
Setting detail: THERAPIES SERIES
Discharge: HOME | End: 2024-04-03
Attending: ORTHOPAEDIC SURGERY
Payer: COMMERCIAL

## 2024-04-03 PROCEDURE — 97530 THERAPEUTIC ACTIVITIES: CPT

## 2024-04-03 PROCEDURE — 97110 THERAPEUTIC EXERCISES: CPT

## 2024-04-03 NOTE — FLOWSHEET NOTE
Outpatient Physical Therapy  [place]           [x] Phone: [phone]   Fax: [phone]  [place]           [] Phone: [phone]   Fax: [phone]        Physical Therapy Daily Treatment Note  Date:  4/3/2024    Patient Name:  Suleiman Mcghee    :  1970  MRN: [id number]  Restrictions/Precautions: WBAT     Diagnosis:   Closed displaced oblique fracture of shaft of left tibia with routine healing, subsequent encounter [Y34.783W]    Date of Injury/Surgery: 24  Treatment Diagnosis:  L Leg pain, weakness, knee/ankle stiffness, gait dysfunction.  Insurance/Certification information:   [facility]    12 visits by 24   Referring Physician:  Rodney Sage MD     PCP: Jayy Diana MD  Next Doctor Visit:    Plan of care signed (Y/N):  ,sent 3/8/24   Outcome Measure: LEFS: 2480  Visit# / total visits:   per POC  Pain level: 3 /10 L knee and ankle  Goals:     Patient goals: return to full function.  Short term goals  Time Frame for Short term goals: Defer to Long Term Goals    Long Term Goals Completed by 6 weeks 24       Long Term Goals: 6 weeks   Long Term Goal 1: Pt will demo I with HEP/symptom management. Progressing   Long Term Goal 2: Pt will demo normal gait mechanics with min/no deficits to ease community mobility. Mostly MET   Long Term Goal 3: Pt will demo 0-130 deg knee A/PROM to ease transfers.    MET   Long Term Goal 4: Pt will completed TUG <12 sec to demo improved mobility.  MET  Long Term Goal 5: Pt will demo >50/80 improvement per LEFS to demo improved functional mobility.   NT  Long Term Goal 6: Pt will demo 5x sit to stand <15 sec to demo improved LE strength and ease with transfers.  MET    Long Term Goal 7: Pt will demo >10 sec with L SLS/tandem stance to demo improved weightbearing tolerance and balance stability to decrease fall risk.  MET       Summary of Evaluation:   Pt is 53 year old female s/p L tibia IM nail insertion 24 after displaced

## 2024-04-03 NOTE — PROGRESS NOTES
4/3/2024, 8:17 AM    4/3/2024 8:18 AM   If you have any questions or concerns, please don't hesitate to call.  Thank you for your referral.    Physician Signature:______________________ Date:______ Time: ________  By signing above, therapist’s plan is approved by physician

## 2024-04-08 ENCOUNTER — HOSPITAL ENCOUNTER (OUTPATIENT)
Dept: PHYSICAL THERAPY | Age: 54
Setting detail: THERAPIES SERIES
Discharge: HOME | End: 2024-04-08
Attending: ORTHOPAEDIC SURGERY
Payer: COMMERCIAL

## 2024-04-08 PROCEDURE — 97530 THERAPEUTIC ACTIVITIES: CPT

## 2024-04-08 PROCEDURE — 97110 THERAPEUTIC EXERCISES: CPT

## 2024-04-08 NOTE — FLOWSHEET NOTE
Outpatient Physical Therapy  [place]           [x] Phone: [phone]   Fax: [phone]  [place]           [] Phone: [phone]   Fax: [phone]        Physical Therapy Daily Treatment Note  Date:  2024    Patient Name:  Suleiman Mcghee    :  1970  MRN: [id number]  Restrictions/Precautions: WBAT     Diagnosis:   Closed displaced oblique fracture of shaft of left tibia with routine healing, subsequent encounter [W13.387K]    Date of Injury/Surgery: 24  Treatment Diagnosis:  L Leg pain, weakness, knee/ankle stiffness, gait dysfunction.  Insurance/Certification information:   [facility]    12 visits by 24   Referring Physician:  Rodney Sage MD     PCP: Jayy Diana MD  Next Doctor Visit:    Plan of care signed (Y/N):  ,sent 3/8/24   Outcome Measure: LEFS: 24/80  Visit# / total visits:  10/12 per POC  Pain level: 2 /10 L knee and ankle  Goals:     Patient goals: return to full function.  Short term goals  Time Frame for Short term goals: Defer to Long Term Goals    Long Term Goals Completed by 6 weeks 24       Long Term Goals: 6 weeks   Long Term Goal 1: Pt will demo I with HEP/symptom management. Progressing   Long Term Goal 2: Pt will demo normal gait mechanics with min/no deficits to ease community mobility. Mostly MET   Long Term Goal 3: Pt will demo 0-130 deg knee A/PROM to ease transfers.    MET   Long Term Goal 4: Pt will completed TUG <12 sec to demo improved mobility.  MET  Long Term Goal 5: Pt will demo >50/80 improvement per LEFS to demo improved functional mobility.   NT  Long Term Goal 6: Pt will demo 5x sit to stand <15 sec to demo improved LE strength and ease with transfers.  MET    Long Term Goal 7: Pt will demo >10 sec with L SLS/tandem stance to demo improved weightbearing tolerance and balance stability to decrease fall risk.  MET       Summary of Evaluation:   Pt is 53 year old female s/p L tibia IM nail insertion 24 after displaced

## 2024-04-10 ENCOUNTER — HOSPITAL ENCOUNTER (OUTPATIENT)
Dept: PHYSICAL THERAPY | Age: 54
Setting detail: THERAPIES SERIES
Discharge: HOME | End: 2024-04-10
Attending: ORTHOPAEDIC SURGERY
Payer: COMMERCIAL

## 2024-04-10 PROCEDURE — 97110 THERAPEUTIC EXERCISES: CPT

## 2024-04-10 PROCEDURE — 97112 NEUROMUSCULAR REEDUCATION: CPT

## 2024-04-10 NOTE — FLOWSHEET NOTE
strength.     Pt rated pain at  2 /10 L ankle, none at knee after treatment.           Pt is 53 year old female s/p L tibia IM nail insertion 1/17/24 after displaced comminuted tibia fracture. Pt now has difficulties completing transfers, general mobility, ADLs and community activities. Pt demo deficits this date that include L LE global weakness, stiffness into L knee/ankle, weightbearing tolerance, gait dysfunction and pain. Pt will benefit with PT services with progression of strength/ROM, gait training and modalities to return to PLOF. Pt prior to onset of current condition had no pain with able to complete full ADLs and work activities. Patient received education on their current pathology and how their condition effects them with their functional activities. Patient understood discussion and questions were answered. Patient understands their activity limitations and understands rational for treatment progression.     Plan for Next Session:   knee ROM to end ranges as able, quad activation, WB onto knee as able, gait training, balance stability, modalities PRN.        Time In / Time Out:  9705-2853       If BWC Please Indicate Time In/Out/Total Time  CPT Code Time in Time out Total Time   TE 64901  1100  1130 30'   Neuro Re-ed 46556  1130 1143 13'   TA 60862        Manual 89167      Vaso 24881       Mod Eval 62503      Aquatics 81312      GT       Total for session     43'        Timed Code/Total Treatment Minutes:   43/43'           1 TE (15')   2 TA (30')     Next Progress Note due:  Eval 3/8/24   Visit 10       Plan of Care Interventions:  [x] Therapeutic Exercise  [x] Modalities:  [x] Therapeutic Activity     [] Ultrasound  [] Estim  [x] Gait Training      [] Cervical Traction [] Lumbar Traction  [x] Neuromuscular Re-education    [x] Cold/hotpack [] Iontophoresis   [x] Instruction in HEP      [x] Vasopneumatic   [] Dry Needling    [x] Manual Therapy               [] Aquatic Therapy

## 2024-04-11 ENCOUNTER — OFFICE VISIT (OUTPATIENT)
Dept: ORTHOPEDIC SURGERY | Age: 54
End: 2024-04-11

## 2024-04-11 VITALS
BODY MASS INDEX: 27.47 KG/M2 | RESPIRATION RATE: 18 BRPM | TEMPERATURE: 97.8 F | OXYGEN SATURATION: 97 % | HEIGHT: 67 IN | WEIGHT: 175 LBS | HEART RATE: 64 BPM

## 2024-04-11 DIAGNOSIS — S82.232D CLOSED DISPLACED OBLIQUE FRACTURE OF SHAFT OF LEFT TIBIA WITH ROUTINE HEALING, SUBSEQUENT ENCOUNTER: ICD-10-CM

## 2024-04-11 DIAGNOSIS — Z09 FOLLOW-UP EXAM: Primary | ICD-10-CM

## 2024-04-11 PROCEDURE — 99024 POSTOP FOLLOW-UP VISIT: CPT | Performed by: ORTHOPAEDIC SURGERY

## 2024-04-11 ASSESSMENT — ENCOUNTER SYMPTOMS
CHEST TIGHTNESS: 0
WHEEZING: 0
EYE REDNESS: 0
COLOR CHANGE: 0
EYE PAIN: 0
SHORTNESS OF BREATH: 0
VOMITING: 0

## 2024-04-11 NOTE — PROGRESS NOTES
4/11/2024     8:25 AM   AMB PAIN ASSESSMENT   Location of Pain Leg   Location Modifiers Left   Severity of Pain 0   Limiting Behavior No   Result of Injury Yes   Work-Related Injury No   Are there other pain locations you wish to document? No     Pt states she is doing very well no issues at all pain level 0/10   Xray done Physician will discuss results with pt

## 2024-04-11 NOTE — PROGRESS NOTES
4/11/2024   Chief Complaint   Patient presents with    Leg Pain     Left tibia fx         History of Present Illness:                             Suleiman Mcghee is a 53 y.o. female who returns today for follow-up of her left tibia fracture.  She has been doing well advancing her activities.  She feels her leg is healing nicely and she has good stability and range of motion.  She still has a mild limp and has been dealing with some intermittent soreness at the knee and swelling the leg but overall this seems to be improving with time.  No concerns or problems today.        Medical History  Patient's medications, allergies, past medical, surgical, social and family histories were reviewed and updated as appropriate.      Review of Systems   Constitutional:  Negative for chills and fever.   HENT:  Negative for congestion and sneezing.    Eyes:  Negative for pain and redness.   Respiratory:  Negative for chest tightness, shortness of breath and wheezing.    Cardiovascular:  Negative for chest pain and palpitations.   Gastrointestinal:  Negative for vomiting.   Musculoskeletal:  Positive for arthralgias.   Skin:  Negative for color change and rash.   Neurological:  Negative for weakness and numbness.   Psychiatric/Behavioral:  Negative for agitation. The patient is not nervous/anxious.                                                Examination:  General Exam:  Vitals: Pulse 64   Temp 97.8 °F (36.6 °C)   Resp 18   Ht 1.702 m (5' 7\")   Wt 79.4 kg (175 lb)   LMP  (LMP Unknown)   SpO2 97%   BMI 27.41 kg/m²    Physical Exam     Left lower extremity:  Well-healed surgical scars over the leg.  Normal alignment of the leg.  No tenderness to palpation at the healing fracture site.  Sensation motor functions intact throughout the lower extremity.  Good active range of motion present at the ankle and knee.    Diagnostic testing:  X-rays reviewed in office, I independently reviewed the films in the office today:

## 2024-04-18 ENCOUNTER — HOSPITAL ENCOUNTER (OUTPATIENT)
Dept: PHYSICAL THERAPY | Age: 54
Setting detail: THERAPIES SERIES
Discharge: HOME | End: 2024-04-18
Attending: ORTHOPAEDIC SURGERY
Payer: COMMERCIAL

## 2024-04-18 PROCEDURE — 97110 THERAPEUTIC EXERCISES: CPT

## 2024-04-18 PROCEDURE — 97530 THERAPEUTIC ACTIVITIES: CPT

## 2024-04-18 NOTE — PROGRESS NOTES
Outpatient Physical Therapy           [place]           [] Phone: [phone]   Fax: [phone]  [place]           [] Phone: [phone]   Fax: [phone]      To: Rodney Sage MD     From: Yeyo Wolfe, PT, DPT, OCS      Patient: Suleiman Mcghee                    : 1970  Diagnosis:  Closed displaced oblique fracture of shaft of left tibia with routine healing, subsequent encounter [W02.412J]        Treatment Diagnosis:    L Leg pain, weakness, knee/ankle stiffness, gait dysfunction.    Date: 2024  [x]  Progress Note                []  Discharge Note    Evaluation Date:  3/8/24    Total Visits to date:   9 Cancels/No-shows to date:  0    Subjective:  Pt stated that her pain was 3/10 today with minimal fluctuations. Been icing and completing meds with ankle effusion. Challenged with mini squat to bend to floor. Compensating with getting in/out of the tub. Using the cane in the community and no cane at home. Currently at 50-60% compared to PLOF.        Plan of Care/Treatment to date:  [x] Therapeutic Exercise    [x] Modalities:  [x] Therapeutic Activity     [] Ultrasound  [] Electrical Stimulation  [x] Gait Training      [] Cervical Traction   [] Lumbar Traction  [x] Neuromuscular Re-education  [x] Cold/hotpack [] Iontophoresis  [x] Instruction in HEP      Other:  [x] Manual Therapy       [x]  Vasopneumatic  [] Aquatic Therapy       []   Dry Needle Therapy                      Objective/Significant Findings At Last Visit/Comments:    Min reduced knee ROM with gait with use of SPC.   Min tenderness with medial joint line tenderness   4+/5 strength knee flex/ext   0-135 deg knee ext/flex   TUG: 10.6 sec with SPC        9.99 sec without SPC  5x sit to stand: 12.18 sec without UE assistance.   L SLS: 14 sec without increase in pain.   Handheld Micro fit     Quads   R:38.7#         L: 29.9#          Hamstrings:    R: 27.9#          L: 21.7#        LEFS:  /80 full function  (Not Tested)

## 2024-04-18 NOTE — FLOWSHEET NOTE
Outpatient Physical Therapy  [place]           [x] Phone: [phone]   Fax: [phone]  [place]           [] Phone: [phone]   Fax: [phone]        Physical Therapy Daily Treatment Note  Date:  2024    Patient Name:  Suleiman Mcghee    :  1970  MRN: [id number]  Restrictions/Precautions: WBAT     Diagnosis:   Closed displaced oblique fracture of shaft of left tibia with routine healing, subsequent encounter [Z51.781D]    Date of Injury/Surgery: 24  Treatment Diagnosis:  L Leg pain, weakness, knee/ankle stiffness, gait dysfunction.  Insurance/Certification information:   [facility]    12 visits by 24   Referring Physician:  Rodney Sage MD     PCP: Jayy Diana MD  Next Doctor Visit:    Plan of care signed (Y/N):  ,sent 3/8/24   Outcome Measure: LEFS:   Visit# / total visits:   per POC  Pain level:   0.5 /10 L knee and ankle  Goals:     Patient goals: return to full function.  Short term goals  Time Frame for Short term goals: Defer to Long Term Goals    Long Term Goals Completed by 6 weeks 24       Long Term Goals: 6 weeks   Long Term Goal 1: Pt will demo I with HEP/symptom management. MET  Long Term Goal 2: Pt will demo normal gait mechanics with min/no deficits to ease community mobility. Mostly MET   Long Term Goal 3: Pt will demo 0-130 deg knee A/PROM to ease transfers.    MET   Long Term Goal 4: Pt will completed TUG <12 sec to demo improved mobility.  MET  Long Term Goal 5: Pt will demo >50/80 improvement per LEFS to demo improved functional mobility.   MET  Long Term Goal 6: Pt will demo 5x sit to stand <15 sec to demo improved LE strength and ease with transfers.  MET    Long Term Goal 7: Pt will demo >10 sec with L SLS/tandem stance to demo improved weightbearing tolerance and balance stability to decrease fall risk.  MET       Summary of Evaluation:   Pt is 53 year old female s/p L tibia IM nail insertion 24 after displaced

## 2024-05-23 ENCOUNTER — OFFICE VISIT (OUTPATIENT)
Dept: ORTHOPEDIC SURGERY | Age: 54
End: 2024-05-23

## 2024-05-23 VITALS — HEART RATE: 57 BPM | OXYGEN SATURATION: 97 % | TEMPERATURE: 96.9 F

## 2024-05-23 DIAGNOSIS — S82.232D CLOSED DISPLACED OBLIQUE FRACTURE OF SHAFT OF LEFT TIBIA WITH ROUTINE HEALING, SUBSEQUENT ENCOUNTER: Primary | ICD-10-CM

## 2024-05-23 NOTE — PROGRESS NOTES
5/23/2024   Chief Complaint   Patient presents with    Leg Injury     Left tibia fx 01/16/2024        History of Present Illness:                             Suleiman Mcghee is a 53 y.o. female returns today for follow-up of her left tibia.    She has been good progress and feels that her leg is healing well.  She has no complaints of pain or instability but still has some occasional tightness in the knee.  Additionally she has intermittent swelling issues in the lower leg which generally improves with the rest elevation    Pt following up in office today for left tibia fx DOI:01/16/2024. Pt states her pain is 0/10 today and she is doing wonderful.       Medical History  Patient's medications, allergies, past medical, surgical, social and family histories were reviewed and updated as appropriate.      Review of Systems   Constitutional:  Negative for activity change and fever.   Respiratory:  Negative for chest tightness and shortness of breath.    Cardiovascular:  Negative for chest pain.   Skin:  Negative for color change.   Neurological:  Negative for dizziness.   Psychiatric/Behavioral:  The patient is not nervous/anxious.                                                Examination:  General Exam:  Vitals: Pulse 57   Temp 96.9 °F (36.1 °C)   SpO2 97%    Physical Exam     Left lower extremity:  Well-healed surgical scars over the lower leg.  Mild nonpitting soft tissue swelling and edema at the lower leg and ankle.  Normal alignment of the ankle and foot and leg.  Near full active range of motion present at the knee and ankle with only mild discomfort at extremes of motion.  Strength is 5/5 with flexion extension across the foot and knee.        Office Procedures:  No orders of the defined types were placed in this encounter.      Assessment and Plan  1.  Status post left tibia intramedullary nailing    She has good signs of healing.  I have encouraged her to continue working on home exercise

## 2024-05-23 NOTE — PATIENT INSTRUCTIONS
Continue weight-bearing as tolerated.  Continue range of motion exercises as instructed.  Ice and elevate as needed.  Tylenol or Motrin for pain.   Follow up 3 mos

## 2024-05-23 NOTE — PROGRESS NOTES
Pt following up in office today for left tibia fx DOI:01/16/2024. Pt states her pain is 0/10 today and she is doing wonderful.

## 2024-09-05 ENCOUNTER — OFFICE VISIT (OUTPATIENT)
Dept: ORTHOPEDIC SURGERY | Age: 54
End: 2024-09-05

## 2024-09-05 VITALS — HEIGHT: 67 IN | HEART RATE: 78 BPM | OXYGEN SATURATION: 96 % | BODY MASS INDEX: 27.41 KG/M2

## 2024-09-05 DIAGNOSIS — S82.232D CLOSED DISPLACED OBLIQUE FRACTURE OF SHAFT OF LEFT TIBIA WITH ROUTINE HEALING, SUBSEQUENT ENCOUNTER: Primary | ICD-10-CM

## 2024-09-05 PROCEDURE — 99212 OFFICE O/P EST SF 10 MIN: CPT | Performed by: ORTHOPAEDIC SURGERY

## 2024-09-05 ASSESSMENT — ENCOUNTER SYMPTOMS
CHEST TIGHTNESS: 0
COLOR CHANGE: 0
SHORTNESS OF BREATH: 0

## 2024-09-05 NOTE — PROGRESS NOTES
9/5/2024   Chief Complaint   Patient presents with    Follow-up    Fracture     Left tibia        History of Present Illness:                             Suleiman Mcghee is a 53 y.o. female who returns today for follow-up of her left tibia.  She has done well to the healing process and feels that her leg is much improved.  She denies any setbacks or problems.  Her leg has good strength and range of motion and stability.    Pt returns to the office for a follow up for a fracture of shaft of left tibia DOI 1/16/24. Pt states she is feeling great 0/10 pain and no concerns.     Medical History  Patient's medications, allergies, past medical, surgical, social and family histories were reviewed and updated as appropriate.      Review of Systems   Constitutional:  Negative for activity change and fever.   Respiratory:  Negative for chest tightness and shortness of breath.    Cardiovascular:  Negative for chest pain.   Skin:  Negative for color change.   Neurological:  Negative for dizziness.   Psychiatric/Behavioral:  The patient is not nervous/anxious.                                                Examination:  General Exam:  Vitals: Pulse 78   Ht 1.702 m (5' 7\")   SpO2 96%   BMI 27.41 kg/m²    Physical Exam       Left lower extremity:  Well-healed surgical scars.  Normal alignment and stability across the lower leg.  Sensation motor functions intact throughout.  Strength is 5 out of 5 with ankle and knee flexion and extension.  Office Procedures:  No orders of the defined types were placed in this encounter.      Assessment and Plan  1.  Status post left tibia open reduction and internal fixation    She has made excellent progress through the healing process and made a full recovery.  I feel that she has been doing very well through the rehabilitation process    She has met her maximal medical improvement.  Follow-up as needed        Electronically signed by Rodney Sage MD on 9/5/2024 at 2:44 PM

## 2024-09-05 NOTE — PROGRESS NOTES
Pt returns to the office for a follow up for a fracture of shaft of left tibia DOI 1/16/24. Pt states she is feeling great 0/10 pain and no concerns.

## 2025-05-24 ENCOUNTER — APPOINTMENT (OUTPATIENT)
Dept: GENERAL RADIOLOGY | Age: 55
End: 2025-05-24

## 2025-05-24 ENCOUNTER — APPOINTMENT (OUTPATIENT)
Dept: CT IMAGING | Age: 55
End: 2025-05-24

## 2025-05-24 ENCOUNTER — HOSPITAL ENCOUNTER (EMERGENCY)
Age: 55
Discharge: HOME | End: 2025-05-24

## 2025-05-24 VITALS
BODY MASS INDEX: 23.54 KG/M2 | HEIGHT: 67 IN | OXYGEN SATURATION: 99 % | WEIGHT: 150 LBS | SYSTOLIC BLOOD PRESSURE: 109 MMHG | RESPIRATION RATE: 11 BRPM | TEMPERATURE: 98.1 F | DIASTOLIC BLOOD PRESSURE: 72 MMHG | HEART RATE: 76 BPM

## 2025-05-24 DIAGNOSIS — R55 SYNCOPE AND COLLAPSE: Primary | ICD-10-CM

## 2025-05-24 DIAGNOSIS — S01.112A EYEBROW LACERATION, LEFT, INITIAL ENCOUNTER: ICD-10-CM

## 2025-05-24 DIAGNOSIS — E03.9 HYPOTHYROIDISM, UNSPECIFIED TYPE: ICD-10-CM

## 2025-05-24 LAB
ALBUMIN SERPL-MCNC: 4.3 G/DL (ref 3.4–5)
ALBUMIN/GLOB SERPL: 1.8 {RATIO} (ref 1.1–2.2)
ALP SERPL-CCNC: 60 U/L (ref 40–129)
ALT SERPL-CCNC: 14 U/L (ref 10–40)
ANION GAP SERPL CALCULATED.3IONS-SCNC: 12 MMOL/L (ref 9–17)
AST SERPL-CCNC: 19 U/L (ref 15–37)
BASOPHILS # BLD: 0.08 K/UL
BASOPHILS NFR BLD: 1 % (ref 0–1)
BILIRUB SERPL-MCNC: 0.3 MG/DL (ref 0–1)
BUN SERPL-MCNC: 12 MG/DL (ref 7–20)
CALCIUM SERPL-MCNC: 9.4 MG/DL (ref 8.3–10.6)
CHLORIDE SERPL-SCNC: 103 MMOL/L (ref 99–110)
CO2 SERPL-SCNC: 25 MMOL/L (ref 21–32)
CREAT SERPL-MCNC: 1.1 MG/DL (ref 0.6–1.1)
EOSINOPHIL # BLD: 0.36 K/UL
EOSINOPHILS RELATIVE PERCENT: 4 % (ref 0–3)
ERYTHROCYTE [DISTWIDTH] IN BLOOD BY AUTOMATED COUNT: 13.4 % (ref 11.7–14.9)
GFR, ESTIMATED: 49 ML/MIN/1.73M2
GLUCOSE SERPL-MCNC: 90 MG/DL (ref 74–99)
HCT VFR BLD AUTO: 43.2 % (ref 37–47)
HGB BLD-MCNC: 14.4 G/DL (ref 12.5–16)
IMM GRANULOCYTES # BLD AUTO: 0.02 K/UL
IMM GRANULOCYTES NFR BLD: 0 %
LYMPHOCYTES NFR BLD: 2.76 K/UL
LYMPHOCYTES RELATIVE PERCENT: 33 % (ref 24–44)
MAGNESIUM SERPL-MCNC: 2.3 MG/DL (ref 1.8–2.4)
MCH RBC QN AUTO: 30.8 PG (ref 27–31)
MCHC RBC AUTO-ENTMCNC: 33.3 G/DL (ref 32–36)
MCV RBC AUTO: 92.3 FL (ref 78–100)
MONOCYTES NFR BLD: 0.54 K/UL
MONOCYTES NFR BLD: 7 % (ref 0–5)
NEUTROPHILS NFR BLD: 55 % (ref 36–66)
NEUTS SEG NFR BLD: 4.53 K/UL
PLATELET # BLD AUTO: 306 K/UL (ref 140–440)
PMV BLD AUTO: 9.6 FL (ref 7.5–11.1)
POTASSIUM SERPL-SCNC: 3.8 MMOL/L (ref 3.5–5.1)
PROT SERPL-MCNC: 6.7 G/DL (ref 6.4–8.2)
RBC # BLD AUTO: 4.68 M/UL (ref 4.2–5.4)
SODIUM SERPL-SCNC: 140 MMOL/L (ref 136–145)
T4 FREE SERPL-MCNC: 0.6 NG/DL (ref 0.9–1.8)
TROPONIN I SERPL HS-MCNC: <6 NG/L (ref 0–14)
TROPONIN I SERPL HS-MCNC: <6 NG/L (ref 0–14)
TSH SERPL DL<=0.05 MIU/L-ACNC: 74.1 UIU/ML (ref 0.27–4.2)
WBC OTHER # BLD: 8.3 K/UL (ref 4–10.5)

## 2025-05-24 PROCEDURE — 90471 IMMUNIZATION ADMIN: CPT | Performed by: PHYSICIAN ASSISTANT

## 2025-05-24 PROCEDURE — 71045 X-RAY EXAM CHEST 1 VIEW: CPT

## 2025-05-24 PROCEDURE — 80053 COMPREHEN METABOLIC PANEL: CPT

## 2025-05-24 PROCEDURE — 96360 HYDRATION IV INFUSION INIT: CPT

## 2025-05-24 PROCEDURE — 90715 TDAP VACCINE 7 YRS/> IM: CPT | Performed by: PHYSICIAN ASSISTANT

## 2025-05-24 PROCEDURE — 85025 COMPLETE CBC W/AUTO DIFF WBC: CPT

## 2025-05-24 PROCEDURE — 84484 ASSAY OF TROPONIN QUANT: CPT

## 2025-05-24 PROCEDURE — 2580000003 HC RX 258: Performed by: PHYSICIAN ASSISTANT

## 2025-05-24 PROCEDURE — 84439 ASSAY OF FREE THYROXINE: CPT

## 2025-05-24 PROCEDURE — 83735 ASSAY OF MAGNESIUM: CPT

## 2025-05-24 PROCEDURE — 12011 RPR F/E/E/N/L/M 2.5 CM/<: CPT

## 2025-05-24 PROCEDURE — 6370000000 HC RX 637 (ALT 250 FOR IP): Performed by: PHYSICIAN ASSISTANT

## 2025-05-24 PROCEDURE — 99285 EMERGENCY DEPT VISIT HI MDM: CPT

## 2025-05-24 PROCEDURE — 84443 ASSAY THYROID STIM HORMONE: CPT

## 2025-05-24 PROCEDURE — 6360000002 HC RX W HCPCS: Performed by: PHYSICIAN ASSISTANT

## 2025-05-24 PROCEDURE — 96361 HYDRATE IV INFUSION ADD-ON: CPT

## 2025-05-24 PROCEDURE — 93005 ELECTROCARDIOGRAM TRACING: CPT | Performed by: PHYSICIAN ASSISTANT

## 2025-05-24 PROCEDURE — 70450 CT HEAD/BRAIN W/O DYE: CPT

## 2025-05-24 RX ORDER — SODIUM CHLORIDE, SODIUM LACTATE, POTASSIUM CHLORIDE, AND CALCIUM CHLORIDE .6; .31; .03; .02 G/100ML; G/100ML; G/100ML; G/100ML
1000 INJECTION, SOLUTION INTRAVENOUS ONCE
Status: COMPLETED | OUTPATIENT
Start: 2025-05-24 | End: 2025-05-24

## 2025-05-24 RX ORDER — ACETAMINOPHEN 500 MG
1000 TABLET ORAL
Status: COMPLETED | OUTPATIENT
Start: 2025-05-24 | End: 2025-05-24

## 2025-05-24 RX ADMIN — SODIUM CHLORIDE, SODIUM LACTATE, POTASSIUM CHLORIDE, AND CALCIUM CHLORIDE 1000 ML: .6; .31; .03; .02 INJECTION, SOLUTION INTRAVENOUS at 15:05

## 2025-05-24 RX ADMIN — CEPHALEXIN 500 MG: 250 CAPSULE ORAL at 15:18

## 2025-05-24 RX ADMIN — TETANUS TOXOID, REDUCED DIPHTHERIA TOXOID AND ACELLULAR PERTUSSIS VACCINE, ADSORBED 0.5 ML: 5; 2.5; 8; 8; 2.5 SUSPENSION INTRAMUSCULAR at 15:20

## 2025-05-24 RX ADMIN — ACETAMINOPHEN 1000 MG: 500 TABLET ORAL at 15:18

## 2025-05-24 ASSESSMENT — LIFESTYLE VARIABLES
HOW OFTEN DO YOU HAVE A DRINK CONTAINING ALCOHOL: NEVER
HOW MANY STANDARD DRINKS CONTAINING ALCOHOL DO YOU HAVE ON A TYPICAL DAY: PATIENT DOES NOT DRINK

## 2025-05-24 ASSESSMENT — PAIN - FUNCTIONAL ASSESSMENT: PAIN_FUNCTIONAL_ASSESSMENT: 0-10

## 2025-05-24 ASSESSMENT — PAIN SCALES - GENERAL
PAINLEVEL_OUTOF10: 10
PAINLEVEL_OUTOF10: 8

## 2025-05-24 ASSESSMENT — PAIN DESCRIPTION - LOCATION
LOCATION: FACE
LOCATION: FACE

## 2025-05-24 ASSESSMENT — PAIN DESCRIPTION - ORIENTATION
ORIENTATION: LEFT
ORIENTATION: LEFT

## 2025-05-24 ASSESSMENT — PAIN DESCRIPTION - DESCRIPTORS: DESCRIPTORS: ACHING

## 2025-05-24 NOTE — ED TRIAGE NOTES
Arrived via EMS, pt had a witnessed syncopal episode and hit her head on the wall. LOC for approx 15 seconds and son states after a couple of minutes she lost consciousness again but he was able to bring her around quickly.  Presents with abrasions to left eye.  Pt states hx low bp.      36.8

## 2025-05-24 NOTE — ED PROVIDER NOTES
present.      Mental Status: She is alert and oriented to person, place, and time.      Cranial Nerves: No cranial nerve deficit.      Sensory: No sensory deficit.      Motor: No weakness.      Coordination: Coordination normal.      Comments: NIHSS 0   Psychiatric:         Mood and Affect: Mood normal.         Behavior: Behavior normal.           DIAGNOSTIC RESULTS   LABS:    Labs Reviewed   CBC WITH AUTO DIFFERENTIAL - Abnormal; Notable for the following components:       Result Value    Monocytes % 7 (*)     Eosinophils % 4 (*)     All other components within normal limits   COMPREHENSIVE METABOLIC PANEL - Abnormal; Notable for the following components:    Est, Glom Filt Rate 49 (*)     All other components within normal limits   TSH REFLEX TO FT4 - Abnormal; Notable for the following components:    TSH 74.10 (*)     All other components within normal limits   T4, FREE - Abnormal; Notable for the following components:    T4 Free 0.6 (*)     All other components within normal limits   TROPONIN   TROPONIN   MAGNESIUM       When ordered only abnormal lab results are displayed. All other labs were within normal range or not returned as of this dictation.    EKG: When ordered, EKG's are interpreted by the Emergency Department Physician in the absence of a cardiologist.  Please see their note for interpretation of EKG.    RADIOLOGY:   Non-plain film images such as CT, Ultrasound and MRI are read by the radiologist.      X-Ray Independently interpreted by me:     Interpretation per the Radiologist below, if available at the time of this note:    CT Head W/O Contrast   Final Result      XR CHEST PORTABLE   Final Result        No results found.      ED Provider US Interpretation.    No results found.    PROCEDURES   Unless otherwise noted below, none     Lac Repair    Date/Time: 5/24/2025 2:47 PM    Performed by: Jj Main PA-C  Authorized by: Jj Main PA-C    Anesthesia:     Anesthesia method:   None  Laceration details:     Location: left eyebrow.    Length (cm):  2    Depth (mm):  4  Exploration:     Hemostasis achieved with:  Direct pressure    Wound exploration: wound explored through full range of motion and entire depth of wound visualized      Contaminated: no    Treatment:     Area cleansed with:  Steffanie    Amount of cleaning:  Standard    Debridement:  None    Undermining:  None  Skin repair:     Repair method:  Tissue adhesive  Approximation:     Approximation:  Close  Repair type:     Repair type:  Simple  Post-procedure details:     Dressing:  Open (no dressing)    Procedure completion:  Tolerated well, no immediate complications        CRITICAL CARE TIME (.cctime)      See interventions in ED course.     EMERGENCY DEPARTMENT COURSE and DIFFERENTIAL DIAGNOSIS/MDM:   Vitals:    Vitals:    05/24/25 1547 05/24/25 1601 05/24/25 1618 05/24/25 1632   BP: 109/68 118/70 (!) 107/54 106/65   Pulse: 66 57 58 64   Resp: 15 13 14 13   Temp:       TempSrc:       SpO2: 100% 100% 100% 100%   Weight:       Height:           Is this patient to be included in the SEP-1 Core Measure due to severe sepsis or septic shock?   No   Exclusion criteria - the patient is NOT to be included for SEP-1 Core Measure due to:  Infection is not suspected    Patient was given the following medications:  Medications   lactated ringers bolus 1,000 mL (0 mLs IntraVENous Stopped 5/24/25 1643)   tetanus-diphth-acell pertussis (BOOSTRIX) injection 0.5 mL (0.5 mLs IntraMUSCular Given 5/24/25 1520)   cephALEXin (KEFLEX) capsule 500 mg (500 mg Oral Given 5/24/25 1518)   acetaminophen (TYLENOL) tablet 1,000 mg (1,000 mg Oral Given 5/24/25 1518)             Chronic Conditions affecting care:    has a past medical history of Anesthesia, Anxiety, Cough, Difficulty swallowing, History of echocardiogram (10/05/2020), History of exercise stress test (10/05/2020), OTHER MEDICAL, Migraine (Last Migraine 3-31-16), Shortness of breath, Sinus

## 2025-05-25 LAB
EKG ATRIAL RATE: 63 BPM
EKG DIAGNOSIS: NORMAL
EKG P AXIS: 80 DEGREES
EKG P-R INTERVAL: 154 MS
EKG Q-T INTERVAL: 454 MS
EKG QRS DURATION: 86 MS
EKG QTC CALCULATION (BAZETT): 464 MS
EKG R AXIS: 77 DEGREES
EKG T AXIS: 155 DEGREES
EKG VENTRICULAR RATE: 63 BPM

## 2025-05-26 PROCEDURE — 93010 ELECTROCARDIOGRAM REPORT: CPT | Performed by: INTERNAL MEDICINE

## (undated) DEVICE — ROD RMR L950MM DIA2.5MM W/ EXTN BALL TIP

## (undated) DEVICE — SUTURE ETHLN SZ 4-0 L18IN NONABSORBABLE BLK L13MM P-3 3/8 699G

## (undated) DEVICE — BIT DRL L330MM DIA4.2MM CALIB L100MM ST 3 FLUT QUIK CPL FOR

## (undated) DEVICE — TOWEL,OR,DSP,ST,BLUE,STD,6/PK,12PK/CS: Brand: MEDLINE

## (undated) DEVICE — DRAPE,U/ SHT,SPLIT,PLAS,STERIL: Brand: MEDLINE

## (undated) DEVICE — MARKER SURG SKIN UTIL REGULAR/FINE 2 TIP W/ RUL AND 9 LBL

## (undated) DEVICE — SUTURE VCRL SZ 0 L27IN ABSRB UD L36MM CT-1 1/2 CIR J260H

## (undated) DEVICE — BANDAGE,SELF ADHRNT,COFLEX,4"X5YD,STRL: Brand: COLABEL

## (undated) DEVICE — SUTURE VCRL SZ 0 L18IN ABSRB UD L36MM CT-1 1/2 CIR J840D

## (undated) DEVICE — YANKAUER,FLEXIBLE HANDLE,REGLR CAPACITY: Brand: MEDLINE INDUSTRIES, INC.

## (undated) DEVICE — GUIDEWIRE ORTH L400MM DIA3.2MM FOR TFN

## (undated) DEVICE — SUTURE VCRL SZ 2-0 L18IN ABSRB UD CT-1 L36MM 1/2 CIR J839D

## (undated) DEVICE — BANDAGE COMPR M W6INXL10YD WHT BGE VELC E MTRX HK AND LOOP

## (undated) DEVICE — Z INACTIVE NO ACTIVE SUPPLIER APPLICATOR MEDICATED 26 CC TINT HI-LITE ORNG STRL CHLORAPREP

## (undated) DEVICE — INTENDED FOR TISSUE SEPARATION, AND OTHER PROCEDURES THAT REQUIRE A SHARP SURGICAL BLADE TO PUNCTURE OR CUT.: Brand: BARD-PARKER ® STAINLESS STEEL BLADES

## (undated) DEVICE — PAD,ABDOMINAL,5"X9",ST,LF,25/BX: Brand: MEDLINE INDUSTRIES, INC.

## (undated) DEVICE — 3M™ STERI-DRAPE™ INSTRUMENT POUCH 1018: Brand: STERI-DRAPE™

## (undated) DEVICE — Z INACTIVE USE 2660664 SOLUTION IRRIG 3000ML 0.9% SOD CHL USP UROMATIC PLAS CONT

## (undated) DEVICE — SPONGE LAP W18XL18IN WHT COT 4 PLY FLD STRUNG RADPQ DISP ST 2 PER PACK

## (undated) DEVICE — BIT DRL L145MM DIA4.2MM ST 3 FLUT NDL PNT QUIK CPL FOR FEM

## (undated) DEVICE — GLOVE SURG SZ 8 CRM LTX FREE POLYISOPRENE POLYMER BEAD ANTI

## (undated) DEVICE — SYRINGE IRRIG 60ML SFT PLIABLE BLB EZ TO GRP 1 HND USE W/

## (undated) DEVICE — TUBING, SUCTION, 9/32" X 10', STRAIGHT: Brand: MEDLINE

## (undated) DEVICE — PENCIL ES CRD L10FT HND SWCHING ROCK SWCH W/ EDGE COAT BLDE

## (undated) DEVICE — ELECTRODE ES AD CRDLSS PT RET REM POLYHESIVE

## (undated) DEVICE — NEEDLE HYPO 20GA L1.5IN YEL POLYPR HUB S STL REG BVL STR

## (undated) DEVICE — DRESSING,GAUZE,XEROFORM,CURAD,5"X9",ST: Brand: CURAD

## (undated) DEVICE — SUTURE ABSORBABLE BRAIDED 2-0 CT-1 27 IN UD VICRYL J259H

## (undated) DEVICE — SUTURE MCRYL SZ 4-0 L27IN ABSRB UD L24MM PS-1 3/8 CIR PRIM Y935H

## (undated) DEVICE — 3M™ STERI-DRAPE™ U-DRAPE 1015: Brand: STERI-DRAPE™

## (undated) DEVICE — Device

## (undated) DEVICE — LINER,SEMI-RIGID,3000CC,50EA/CS: Brand: MEDLINE

## (undated) DEVICE — GOWN,SURGICAL,AURORA,SLEEVE: Brand: MEDLINE

## (undated) DEVICE — SPONGE GZ W4XL8IN COT WVN 12 PLY

## (undated) DEVICE — COVER,C-ARM,41X74: Brand: MEDLINE

## (undated) DEVICE — DRAPE,EXTREMITY,89X128,STERILE: Brand: MEDLINE

## (undated) DEVICE — SOLUTION IV 1000ML 0.9% SOD CHL FOR IRRIG PLAS CONT

## (undated) DEVICE — PADDING CAST W6INXL4YD COT COHESIVE HND TEARABLE SPEC 100

## (undated) DEVICE — SOLUTION IV IRRIG WATER 1000ML POUR BRL 2F7114

## (undated) DEVICE — COUNTER NDL 30 COUNT FOAM STRP SGL MAG